# Patient Record
Sex: FEMALE | Race: WHITE | NOT HISPANIC OR LATINO | URBAN - NONMETROPOLITAN AREA
[De-identification: names, ages, dates, MRNs, and addresses within clinical notes are randomized per-mention and may not be internally consistent; named-entity substitution may affect disease eponyms.]

---

## 2020-07-29 ENCOUNTER — OFFICE VISIT (OUTPATIENT)
Dept: URBAN - NONMETROPOLITAN AREA CLINIC 2 | Facility: CLINIC | Age: 75
End: 2020-07-29
Payer: MEDICARE

## 2020-07-29 DIAGNOSIS — K50.90 CROHN'S DISEASE: ICD-10-CM

## 2020-07-29 DIAGNOSIS — Z12.11 SCREENING FOR COLON CANCER: ICD-10-CM

## 2020-07-29 DIAGNOSIS — E80.6 HYPERBILIRUBINEMIA: ICD-10-CM

## 2020-07-29 DIAGNOSIS — Z86.19 HISTORY OF CLOSTRIDIUM DIFFICILE COLITIS: ICD-10-CM

## 2020-07-29 DIAGNOSIS — R10.9 ABDOMINAL PAIN: ICD-10-CM

## 2020-07-29 PROCEDURE — 1036F TOBACCO NON-USER: CPT | Performed by: NURSE PRACTITIONER

## 2020-07-29 PROCEDURE — G8427 DOCREV CUR MEDS BY ELIG CLIN: HCPCS | Performed by: NURSE PRACTITIONER

## 2020-07-29 PROCEDURE — 99213 OFFICE O/P EST LOW 20 MIN: CPT | Performed by: NURSE PRACTITIONER

## 2020-07-29 PROCEDURE — 3017F COLORECTAL CA SCREEN DOC REV: CPT | Performed by: NURSE PRACTITIONER

## 2020-07-29 PROCEDURE — G8417 CALC BMI ABV UP PARAM F/U: HCPCS | Performed by: NURSE PRACTITIONER

## 2020-07-29 RX ORDER — FLUTICASONE PROPIONATE 50 UG/1
SPRAY, METERED NASAL
Qty: 0 | Refills: 0 | Status: ACTIVE | COMMUNITY
Start: 1900-01-01

## 2020-07-29 RX ORDER — MERCAPTOPURINE 50 MG/1
2 TABS PO DAILY TABLET ORAL
Qty: 180 | Refills: 3 | Status: ACTIVE | COMMUNITY
Start: 2019-10-10 | End: 2020-10-04

## 2020-07-29 RX ORDER — MESALAMINE 800 MG/1
TAKE 2 TABLETS (1,600 MG) BY ORAL ROUTE 3 TIMES PER DAY FOR 90 DAYS TABLET, DELAYED RELEASE ORAL
Qty: 540 | Refills: 3 | Status: ACTIVE | COMMUNITY
Start: 2019-10-10 | End: 2020-10-04

## 2020-07-29 RX ORDER — ESTRADIOL 0.1 MG/G
CREAM VAGINAL
Qty: 0 | Refills: 0 | Status: ACTIVE | COMMUNITY
Start: 1900-01-01

## 2020-07-29 RX ORDER — LEVOTHYROXINE SODIUM 0.05 MG/1
TABLET ORAL
Qty: 0 | Refills: 0 | Status: ACTIVE | COMMUNITY
Start: 1900-01-01

## 2020-07-29 RX ORDER — PREDNISONE 5 MG/1
TAKE 4 PILLS BY MOUTH DAILY FOR 1 WEEK, 3 DAILY FOR A WEEK, 2 DAILY FOR A WEEK, 1 DAILY FOR A WEEK TABLET ORAL
Qty: 100 | Refills: 3 | Status: ACTIVE | COMMUNITY
Start: 2019-09-12

## 2020-07-29 NOTE — HPI-OTHER HISTORIES
7/29/20 Patient presents for follow up for Crohns. She remains on Asacol 6 a day and Imuran 100mg daily. No flare since her last visit, however she's had trouble with reflux symptoms and PCP has noted her bilirubin has gone up to 2 in June. Last June it was 1.15 and prior to that 1.35. Her reflux is controlled since Dr. Faith started Pepcid 20mg BID and now with no breakthrough symptoms. She is having some lower extremity edmea.   In terms of her Crohns, she has two formed stool daily. No bleeding. She is very pleased with these symptoms and tells me she is willing to put up with side effects. She does not want to go back to 4 tablets on the Asacol due to the severity of flares last year. TG  10/10/2019 Mrs. Halina Soares is a 74 year old female with Crohns who presents for follow up. She saw Dr. Dykes in September with flare that was ongoing despite pred taper given by PCP. She increased her Asacol from 3-4 daily to 6 daily after her last visit and completed a second pred taper. She took her last pred tablet this morning. Her sx have resolved. She continues on Asacol 6 tablets daily and Imuran 100 mg daily. She has 1-3 bowel movements daily that are formed. No bleeding. No mucous. No abdominal pain. Her last colonoscopy 28/2018 showed mild disease in recto-sig, 2 inflammatory polyps in th rectum, o/w normal. TI was not entered.   Her only complaint today is intermittent bloating and gas with borborygmi. She is taking a probiotic but does not recall the name. TG

## 2020-10-28 ENCOUNTER — OFFICE VISIT (OUTPATIENT)
Dept: URBAN - NONMETROPOLITAN AREA CLINIC 2 | Facility: CLINIC | Age: 75
End: 2020-10-28
Payer: MEDICARE

## 2020-10-28 DIAGNOSIS — Z12.11 SCREENING FOR COLON CANCER: ICD-10-CM

## 2020-10-28 DIAGNOSIS — Z86.19 HISTORY OF CLOSTRIDIUM DIFFICILE COLITIS: ICD-10-CM

## 2020-10-28 DIAGNOSIS — K50.90 CROHN'S DISEASE: ICD-10-CM

## 2020-10-28 DIAGNOSIS — R10.9 ABDOMINAL PAIN: ICD-10-CM

## 2020-10-28 DIAGNOSIS — E80.6 HYPERBILIRUBINEMIA: ICD-10-CM

## 2020-10-28 PROCEDURE — 3017F COLORECTAL CA SCREEN DOC REV: CPT | Performed by: INTERNAL MEDICINE

## 2020-10-28 PROCEDURE — 99213 OFFICE O/P EST LOW 20 MIN: CPT | Performed by: INTERNAL MEDICINE

## 2020-10-28 PROCEDURE — G8417 CALC BMI ABV UP PARAM F/U: HCPCS | Performed by: INTERNAL MEDICINE

## 2020-10-28 PROCEDURE — 1036F TOBACCO NON-USER: CPT | Performed by: INTERNAL MEDICINE

## 2020-10-28 PROCEDURE — G8427 DOCREV CUR MEDS BY ELIG CLIN: HCPCS | Performed by: INTERNAL MEDICINE

## 2020-10-28 PROCEDURE — G8482 FLU IMMUNIZE ORDER/ADMIN: HCPCS | Performed by: INTERNAL MEDICINE

## 2020-10-28 RX ORDER — ESTRADIOL 0.1 MG/G
CREAM VAGINAL
Qty: 0 | Refills: 0 | Status: ACTIVE | COMMUNITY
Start: 1900-01-01

## 2020-10-28 RX ORDER — PREDNISONE 5 MG/1
TAKE 4 PILLS BY MOUTH DAILY FOR 1 WEEK, 3 DAILY FOR A WEEK, 2 DAILY FOR A WEEK, 1 DAILY FOR A WEEK TABLET ORAL
Qty: 100 | Refills: 3 | Status: ACTIVE | COMMUNITY
Start: 2019-09-12

## 2020-10-28 RX ORDER — LEVOTHYROXINE SODIUM 0.05 MG/1
TABLET ORAL
Qty: 0 | Refills: 0 | Status: ACTIVE | COMMUNITY
Start: 1900-01-01

## 2020-10-28 RX ORDER — FLUTICASONE PROPIONATE 50 UG/1
SPRAY, METERED NASAL
Qty: 0 | Refills: 0 | Status: ACTIVE | COMMUNITY
Start: 1900-01-01

## 2020-10-28 NOTE — HPI-OTHER HISTORIES
10/10/2019 Mrs. Halina Soares is a 74 year old female with Crohns who presents for follow up. She saw Dr. Dykes in September with flare that was ongoing despite pred taper given by PCP. She increased her Asacol from 3-4 daily to 6 daily after her last visit and completed a second pred taper. She took her last pred tablet this morning. Her sx have resolved. She continues on Asacol 6 tablets daily and Imuran 100 mg daily. She has 1-3 bowel movements daily that are formed. No bleeding. No mucous. No abdominal pain. Her last colonoscopy 28/2018 showed mild disease in recto-sig, 2 inflammatory polyps in th rectum, o/w normal. TI was not entered.   Her only complaint today is intermittent bloating and gas with borborygmi. She is taking a probiotic but does not recall the name.    7/29/20 Patient presents for follow up for Crohns. She remains on Asacol 6 a day and Imuran 100mg daily. No flare since her last visit, however she's had trouble with reflux symptoms and PCP has noted her bilirubin has gone up to 2 in June. Last June it was 1.15 and prior to that 1.35. Her reflux is controlled since Dr. Faith started Pepcid 20mg BID and now with no breakthrough symptoms. She is having some lower extremity edmea.   In terms of her Crohns, she has two formed stool daily. No bleeding. She is very pleased with these symptoms and tells me she is willing to put up with side effects. She does not want to go back to 4 tablets on the Asacol due to the severity of flares last year.   10/28/20  Patient presents for follow up for Crohns on Asacol and Imuran. Her bilirubin continues to trend up. Otherwise her UC seems to be well controlled. No recent flares. Stools are formed. No rectal bleeding. She has not had a flare. No EIM.

## 2020-10-29 LAB
BASO (ABSOLUTE): 0
BASOS: 0
EOS (ABSOLUTE): 0.2
EOS: 7
HEMATOCRIT: 30.7
HEMATOLOGY COMMENTS:: (no result)
HEMOGLOBIN: 10.8
IMMATURE CELLS: (no result)
IMMATURE GRANS (ABS): 0
IMMATURE GRANULOCYTES: 0
LYMPHS (ABSOLUTE): 0.6
LYMPHS: 25
MCH: 41.4
MCHC: 35.2
MCV: 118
MONOCYTES(ABSOLUTE): 0.2
MONOCYTES: 9
NEUTROPHILS (ABSOLUTE): 1.4
NEUTROPHILS: 59
NRBC: (no result)
PLATELETS: 174
RBC: 2.61
RDW: 15.4
WBC: 2.4

## 2020-11-02 ENCOUNTER — LAB OUTSIDE AN ENCOUNTER (OUTPATIENT)
Dept: URBAN - METROPOLITAN AREA CLINIC 92 | Facility: CLINIC | Age: 75
End: 2020-11-02

## 2020-11-02 ENCOUNTER — TELEPHONE ENCOUNTER (OUTPATIENT)
Dept: URBAN - METROPOLITAN AREA CLINIC 92 | Facility: CLINIC | Age: 75
End: 2020-11-02

## 2020-11-09 ENCOUNTER — TELEPHONE ENCOUNTER (OUTPATIENT)
Dept: URBAN - NONMETROPOLITAN AREA CLINIC 2 | Facility: CLINIC | Age: 75
End: 2020-11-09

## 2020-11-09 RX ORDER — MESALAMINE 800 MG/1
TAKE 2 TABLETS (1,600 MG) BY ORAL ROUTE 3 TIMES PER DAY FOR 90 DAYS TABLET, DELAYED RELEASE ORAL
Qty: 540 TABLET | Refills: 3
Start: 2019-10-10 | End: 2020-10-04

## 2020-11-20 LAB
A/G RATIO: 1.6
ALBUMIN: 3.7
ALKALINE PHOSPHATASE: 74
ALT (SGPT): 18
AST (SGOT): 27
BASO (ABSOLUTE): 0
BASOS: 0
BILIRUBIN, TOTAL: 1.2
BUN/CREATININE RATIO: 20
BUN: 19
CALCIUM: 9.2
CARBON DIOXIDE, TOTAL: 24
CHLORIDE: 110
CREATININE: 0.94
EGFR IF AFRICN AM: 69
EGFR IF NONAFRICN AM: 60
EOS (ABSOLUTE): 0.1
EOS: 4
GLOBULIN, TOTAL: 2.3
GLUCOSE: 95
HEMATOCRIT: 32.5
HEMATOLOGY COMMENTS:: (no result)
HEMOGLOBIN: 11.2
IMMATURE CELLS: (no result)
IMMATURE GRANS (ABS): 0
IMMATURE GRANULOCYTES: 0
LYMPHS (ABSOLUTE): 0.8
LYMPHS: 28
MCH: 40.3
MCHC: 34.5
MCV: 117
MONOCYTES(ABSOLUTE): 0.3
MONOCYTES: 12
NEUTROPHILS (ABSOLUTE): 1.6
NEUTROPHILS: 56
NRBC: (no result)
PLATELETS: 156
POTASSIUM: 4.1
PROTEIN, TOTAL: 6
RBC: 2.78
RDW: 14
SODIUM: 145
WBC: 2.8

## 2020-11-25 ENCOUNTER — TELEPHONE ENCOUNTER (OUTPATIENT)
Dept: URBAN - NONMETROPOLITAN AREA CLINIC 2 | Facility: CLINIC | Age: 75
End: 2020-11-25

## 2020-12-01 ENCOUNTER — ERX REFILL RESPONSE (OUTPATIENT)
Age: 75
End: 2020-12-01

## 2020-12-01 RX ORDER — MERCAPTOPURINE 50 MG/1
TAKE 1 TABLETS BY MOUTH ONCE DAILY TABLET ORAL DAILY
Qty: 90 TABLETS | Refills: 1

## 2020-12-08 ENCOUNTER — LAB OUTSIDE AN ENCOUNTER (OUTPATIENT)
Dept: URBAN - NONMETROPOLITAN AREA CLINIC 2 | Facility: CLINIC | Age: 75
End: 2020-12-08

## 2020-12-09 LAB
A/G RATIO: 1.6
ALBUMIN: 3.9
ALKALINE PHOSPHATASE: 82
ALT (SGPT): 13
AST (SGOT): 23
BASO (ABSOLUTE): 0
BASOS: 0
BILIRUBIN, TOTAL: 1.3
BUN/CREATININE RATIO: 24
BUN: 21
CALCIUM: 9.5
CARBON DIOXIDE, TOTAL: 26
CHLORIDE: 104
CREATININE: 0.88
EGFR IF AFRICN AM: 75
EGFR IF NONAFRICN AM: 65
EOS (ABSOLUTE): 0.1
EOS: 3
GLOBULIN, TOTAL: 2.4
GLUCOSE: 77
HEMATOCRIT: 35.4
HEMATOLOGY COMMENTS:: (no result)
HEMOGLOBIN: 12.1
IMMATURE CELLS: (no result)
IMMATURE GRANS (ABS): 0
IMMATURE GRANULOCYTES: 0
LYMPHS (ABSOLUTE): 1
LYMPHS: 25
MCH: 38.9
MCHC: 34.2
MCV: 114
MONOCYTES(ABSOLUTE): 0.3
MONOCYTES: 9
NEUTROPHILS (ABSOLUTE): 2.3
NEUTROPHILS: 63
NRBC: (no result)
PLATELETS: 111
POTASSIUM: 3.6
PROTEIN, TOTAL: 6.3
RBC: 3.11
RDW: 12.7
SODIUM: 142
WBC: 3.8

## 2020-12-11 ENCOUNTER — TELEPHONE ENCOUNTER (OUTPATIENT)
Dept: URBAN - METROPOLITAN AREA CLINIC 92 | Facility: CLINIC | Age: 75
End: 2020-12-11

## 2021-01-12 ENCOUNTER — TELEPHONE ENCOUNTER (OUTPATIENT)
Dept: URBAN - METROPOLITAN AREA CLINIC 92 | Facility: CLINIC | Age: 76
End: 2021-01-12

## 2021-02-05 ENCOUNTER — LAB OUTSIDE AN ENCOUNTER (OUTPATIENT)
Dept: URBAN - NONMETROPOLITAN AREA CLINIC 2 | Facility: CLINIC | Age: 76
End: 2021-02-05

## 2021-02-05 ENCOUNTER — OFFICE VISIT (OUTPATIENT)
Dept: URBAN - NONMETROPOLITAN AREA CLINIC 2 | Facility: CLINIC | Age: 76
End: 2021-02-05
Payer: MEDICARE

## 2021-02-05 DIAGNOSIS — Z86.19 HISTORY OF CLOSTRIDIUM DIFFICILE COLITIS: ICD-10-CM

## 2021-02-05 DIAGNOSIS — K50.90 CROHN'S DISEASE: ICD-10-CM

## 2021-02-05 DIAGNOSIS — Z51.81 THERAPEUTIC DRUG MONITORING: ICD-10-CM

## 2021-02-05 DIAGNOSIS — R10.9 ABDOMINAL PAIN: ICD-10-CM

## 2021-02-05 DIAGNOSIS — Z12.11 SCREENING FOR COLON CANCER: ICD-10-CM

## 2021-02-05 PROCEDURE — 3017F COLORECTAL CA SCREEN DOC REV: CPT | Performed by: INTERNAL MEDICINE

## 2021-02-05 PROCEDURE — G8417 CALC BMI ABV UP PARAM F/U: HCPCS | Performed by: INTERNAL MEDICINE

## 2021-02-05 PROCEDURE — G8484 FLU IMMUNIZE NO ADMIN: HCPCS | Performed by: INTERNAL MEDICINE

## 2021-02-05 PROCEDURE — G9903 PT SCRN TBCO ID AS NON USER: HCPCS | Performed by: INTERNAL MEDICINE

## 2021-02-05 PROCEDURE — 99214 OFFICE O/P EST MOD 30 MIN: CPT | Performed by: INTERNAL MEDICINE

## 2021-02-05 PROCEDURE — G8427 DOCREV CUR MEDS BY ELIG CLIN: HCPCS | Performed by: INTERNAL MEDICINE

## 2021-02-05 RX ORDER — PREDNISONE 5 MG/1
TAKE 4 PILLS BY MOUTH DAILY FOR 1 WEEK, 3 DAILY FOR A WEEK, 2 DAILY FOR A WEEK, 1 DAILY FOR A WEEK TABLET ORAL
Qty: 100 | Refills: 3 | Status: ACTIVE | COMMUNITY
Start: 2019-09-12

## 2021-02-05 RX ORDER — MERCAPTOPURINE 50 MG/1
TAKE 1 TABLETS BY MOUTH ONCE DAILY TABLET ORAL DAILY
Qty: 90 TABLETS | Refills: 1 | Status: ACTIVE | COMMUNITY

## 2021-02-05 RX ORDER — FLUTICASONE PROPIONATE 50 UG/1
SPRAY, METERED NASAL
Qty: 0 | Refills: 0 | Status: ACTIVE | COMMUNITY
Start: 1900-01-01

## 2021-02-05 RX ORDER — SODIUM, POTASSIUM,MAG SULFATES 17.5-3.13G
TAKE 240 ML SOLUTION, RECONSTITUTED, ORAL ORAL
Qty: 1 KIT | Refills: 0 | OUTPATIENT
Start: 2021-02-05 | End: 2021-02-06

## 2021-02-05 RX ORDER — ESTRADIOL 0.1 MG/G
CREAM VAGINAL
Qty: 0 | Refills: 0 | Status: ACTIVE | COMMUNITY
Start: 1900-01-01

## 2021-02-05 RX ORDER — LEVOTHYROXINE SODIUM 0.05 MG/1
TABLET ORAL
Qty: 0 | Refills: 0 | Status: ACTIVE | COMMUNITY
Start: 1900-01-01

## 2021-02-05 NOTE — HPI-TODAY'S VISIT:
Patient presents for follow-up of Crohn's disease.  She is currently on mercaptopurine 50 mg daily Asacol.  She had been on a 100 but lab work revealed mild leukopenia and lymphocytopenia as well as a rising bilirubin so that mercaptopurine was stopped for a while and then started back in 50 mg.  Her numbers have subsequently resolved.  She is followed at the hematology clinic for this. In terms of the Crohn's disease she is, overall, doing well.  She does state that since January 1 she has had 9 episodes of rectal bleeding.  This is enough to discolor the water in the commode.  Typically this bleeding is with passage of a hard stool.  She states that she has 1-3 formed stools daily.  At times they are hard and she has to strain.  She has no rectal pain, burning or itching.  She is not aware of hemorrhoids.  She has no abdominal pain.  There is some discomfort if she has a great deal of gas.  She has no fever, chills or night sweats.  She denies extraintestinal manifestations. She has no upper GI symptoms.

## 2021-03-23 ENCOUNTER — CLAIMS CREATED FROM THE CLAIM WINDOW (OUTPATIENT)
Dept: URBAN - METROPOLITAN AREA CLINIC 4 | Facility: CLINIC | Age: 76
End: 2021-03-23
Payer: MEDICARE

## 2021-03-23 ENCOUNTER — OFFICE VISIT (OUTPATIENT)
Dept: URBAN - NONMETROPOLITAN AREA SURGERY CENTER 1 | Facility: SURGERY CENTER | Age: 76
End: 2021-03-23
Payer: MEDICARE

## 2021-03-23 DIAGNOSIS — K50.90 ACUTE CROHN'S DISEASE: ICD-10-CM

## 2021-03-23 DIAGNOSIS — D12.2 ADENOMA OF ASCENDING COLON: ICD-10-CM

## 2021-03-23 DIAGNOSIS — K63.89 MASS OF HEPATIC FLEXURE OF COLON: ICD-10-CM

## 2021-03-23 DIAGNOSIS — D12.2 BENIGN NEOPLASM OF ASCENDING COLON: ICD-10-CM

## 2021-03-23 PROCEDURE — 88313 SPECIAL STAINS GROUP 2: CPT | Performed by: PATHOLOGY

## 2021-03-23 PROCEDURE — 88341 IMHCHEM/IMCYTCHM EA ADD ANTB: CPT | Performed by: PATHOLOGY

## 2021-03-23 PROCEDURE — 88342 IMHCHEM/IMCYTCHM 1ST ANTB: CPT | Performed by: PATHOLOGY

## 2021-03-23 PROCEDURE — 45385 COLONOSCOPY W/LESION REMOVAL: CPT | Performed by: INTERNAL MEDICINE

## 2021-03-23 PROCEDURE — G8907 PT DOC NO EVENTS ON DISCHARG: HCPCS | Performed by: INTERNAL MEDICINE

## 2021-03-23 PROCEDURE — 45380 COLONOSCOPY AND BIOPSY: CPT | Performed by: INTERNAL MEDICINE

## 2021-03-23 PROCEDURE — 88305 TISSUE EXAM BY PATHOLOGIST: CPT | Performed by: PATHOLOGY

## 2021-04-29 ENCOUNTER — OFFICE VISIT (OUTPATIENT)
Dept: URBAN - NONMETROPOLITAN AREA CLINIC 13 | Facility: CLINIC | Age: 76
End: 2021-04-29
Payer: MEDICARE

## 2021-04-29 VITALS
SYSTOLIC BLOOD PRESSURE: 162 MMHG | BODY MASS INDEX: 36.64 KG/M2 | WEIGHT: 228 LBS | TEMPERATURE: 97.5 F | DIASTOLIC BLOOD PRESSURE: 78 MMHG | HEART RATE: 71 BPM | HEIGHT: 66 IN

## 2021-04-29 DIAGNOSIS — R10.9 ABDOMINAL PAIN: ICD-10-CM

## 2021-04-29 DIAGNOSIS — Z86.19 HISTORY OF CLOSTRIDIUM DIFFICILE COLITIS: ICD-10-CM

## 2021-04-29 DIAGNOSIS — Z86.010 PERSONAL HISTORY OF COLONIC POLYPS: ICD-10-CM

## 2021-04-29 DIAGNOSIS — K50.90 CROHN'S DISEASE: ICD-10-CM

## 2021-04-29 DIAGNOSIS — Z12.11 SCREENING FOR COLON CANCER: ICD-10-CM

## 2021-04-29 DIAGNOSIS — Z51.81 THERAPEUTIC DRUG MONITORING: ICD-10-CM

## 2021-04-29 PROCEDURE — 99214 OFFICE O/P EST MOD 30 MIN: CPT | Performed by: INTERNAL MEDICINE

## 2021-04-29 RX ORDER — PREDNISONE 5 MG/1
TAKE 4 PILLS BY MOUTH DAILY FOR 1 WEEK, 3 DAILY FOR A WEEK, 2 DAILY FOR A WEEK, 1 DAILY FOR A WEEK TABLET ORAL
Qty: 100 | Refills: 3 | Status: ACTIVE | COMMUNITY
Start: 2019-09-12

## 2021-04-29 RX ORDER — FLUTICASONE PROPIONATE 50 UG/1
SPRAY, METERED NASAL
Qty: 0 | Refills: 0 | Status: ACTIVE | COMMUNITY
Start: 1900-01-01

## 2021-04-29 RX ORDER — MERCAPTOPURINE 50 MG/1
TAKE 1 TABLETS BY MOUTH ONCE DAILY TABLET ORAL DAILY
Qty: 90 TABLETS | Refills: 1 | Status: ACTIVE | COMMUNITY

## 2021-04-29 RX ORDER — ESTRADIOL 0.1 MG/G
CREAM VAGINAL
Qty: 0 | Refills: 0 | Status: ACTIVE | COMMUNITY
Start: 1900-01-01

## 2021-04-29 RX ORDER — LEVOTHYROXINE SODIUM 0.05 MG/1
TABLET ORAL
Qty: 0 | Refills: 0 | Status: ACTIVE | COMMUNITY
Start: 1900-01-01

## 2021-04-29 NOTE — HPI-TODAY'S VISIT:
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  She is currently on mercaptopurine 50 mg daily Asacol.  She had been on a 100 but lab work revealed mild leukopenia and lymphocytopenia as well as a rising bilirubin so that mercaptopurine was stopped for a while and then started back in 50 mg.  Her numbers have subsequently resolved.  She is followed at the hematology clinic for this. In terms of the Crohn's disease she is, overall, doing well.  She does state that since January 1 she has had 9 episodes of rectal bleeding.  This is enough to discolor the water in the commode.  Typically this bleeding is with passage of a hard stool.  She states that she has 1-3 formed stools daily.  At times they are hard and she has to strain.  She has no rectal pain, burning or itching.  She is not aware of hemorrhoids.  She has no abdominal pain.  There is some discomfort if she has a great deal of gas.  She has no fever, chills or night sweats.  She denies extraintestinal manifestations. She has no upper GI symptoms.  4/29/21 Doing well. Colonoscopy was good with a polyp. labs reviewed from PCP in March, lfts are normal with t bili slightly elevated at 1.22. she is going to [facility] for labs soon. will give her orders for cbc, bmp, hepatic function panel to see direct/indirect. otherwise she is doing great. TG

## 2021-07-29 ENCOUNTER — OFFICE VISIT (OUTPATIENT)
Dept: URBAN - NONMETROPOLITAN AREA CLINIC 13 | Facility: CLINIC | Age: 76
End: 2021-07-29

## 2021-08-02 ENCOUNTER — ERX REFILL RESPONSE (OUTPATIENT)
Dept: URBAN - NONMETROPOLITAN AREA CLINIC 2 | Facility: CLINIC | Age: 76
End: 2021-08-02

## 2021-08-02 RX ORDER — MERCAPTOPURINE 50 MG/1
TAKE 1 TABLET BY MOUTH ONCE DAILY FOR 90 DAYS TABLET ORAL
Qty: 90 TABLET | Refills: 1 | OUTPATIENT

## 2021-08-02 RX ORDER — MERCAPTOPURINE 50 MG/1
TAKE 1 TABLETS BY MOUTH ONCE DAILY TABLET ORAL DAILY
Qty: 90 TABLETS | Refills: 1 | OUTPATIENT

## 2021-08-26 ENCOUNTER — OFFICE VISIT (OUTPATIENT)
Dept: URBAN - NONMETROPOLITAN AREA CLINIC 13 | Facility: CLINIC | Age: 76
End: 2021-08-26
Payer: MEDICARE

## 2021-08-26 ENCOUNTER — WEB ENCOUNTER (OUTPATIENT)
Dept: URBAN - NONMETROPOLITAN AREA CLINIC 13 | Facility: CLINIC | Age: 76
End: 2021-08-26

## 2021-08-26 VITALS
WEIGHT: 227 LBS | SYSTOLIC BLOOD PRESSURE: 133 MMHG | HEART RATE: 64 BPM | DIASTOLIC BLOOD PRESSURE: 76 MMHG | BODY MASS INDEX: 36.48 KG/M2 | HEIGHT: 66 IN

## 2021-08-26 DIAGNOSIS — R10.84 ABDOMINAL PAIN, GENERALIZED: ICD-10-CM

## 2021-08-26 DIAGNOSIS — Z12.11 SCREENING FOR COLON CANCER: ICD-10-CM

## 2021-08-26 DIAGNOSIS — K50.80 CROHN'S DISEASE COLON: ICD-10-CM

## 2021-08-26 DIAGNOSIS — Z51.81 THERAPEUTIC DRUG MONITORING: ICD-10-CM

## 2021-08-26 DIAGNOSIS — Z86.19 HISTORY OF CLOSTRIDIUM DIFFICILE COLITIS: ICD-10-CM

## 2021-08-26 DIAGNOSIS — Z86.010 PERSONAL HISTORY OF COLONIC POLYPS: ICD-10-CM

## 2021-08-26 PROCEDURE — 99213 OFFICE O/P EST LOW 20 MIN: CPT | Performed by: NURSE PRACTITIONER

## 2021-08-26 RX ORDER — MERCAPTOPURINE 50 MG/1
TAKE 1 TABLET BY MOUTH ONCE DAILY FOR 90 DAYS TABLET ORAL
Qty: 90 TABLET | Refills: 1 | Status: ACTIVE | COMMUNITY

## 2021-08-26 RX ORDER — PREDNISONE 5 MG/1
TAKE 4 PILLS BY MOUTH DAILY FOR 1 WEEK, 3 DAILY FOR A WEEK, 2 DAILY FOR A WEEK, 1 DAILY FOR A WEEK TABLET ORAL
Qty: 100 | Refills: 3 | Status: ON HOLD | COMMUNITY
Start: 2019-09-12

## 2021-08-26 RX ORDER — FLUTICASONE PROPIONATE 50 UG/1
SPRAY, METERED NASAL
Qty: 0 | Refills: 0 | Status: ACTIVE | COMMUNITY
Start: 1900-01-01

## 2021-08-26 RX ORDER — MERCAPTOPURINE 50 MG/1
TAKE 1 TABLET BY MOUTH ONCE DAILY FOR 90 DAYS TABLET ORAL
OUTPATIENT

## 2021-08-26 RX ORDER — LEVOTHYROXINE SODIUM 0.05 MG/1
TABLET ORAL
Qty: 0 | Refills: 0 | Status: ACTIVE | COMMUNITY
Start: 1900-01-01

## 2021-08-26 RX ORDER — ESTRADIOL 0.1 MG/G
CREAM VAGINAL
Qty: 0 | Refills: 0 | Status: ACTIVE | COMMUNITY
Start: 1900-01-01

## 2021-08-26 NOTE — HPI-TODAY'S VISIT:
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  She is currently on mercaptopurine 50 mg daily Asacol.  She had been on a 100 but lab work revealed mild leukopenia and lymphocytopenia as well as a rising bilirubin so that mercaptopurine was stopped for a while and then started back in 50 mg.  Her numbers have subsequently resolved.  She is followed at the hematology clinic for this. In terms of the Crohn's disease she is, overall, doing well.  She does state that since January 1 she has had 9 episodes of rectal bleeding.  This is enough to discolor the water in the commode.  Typically this bleeding is with passage of a hard stool.  She states that she has 1-3 formed stools daily.  At times they are hard and she has to strain.  She has no rectal pain, burning or itching.  She is not aware of hemorrhoids.  She has no abdominal pain.  There is some discomfort if she has a great deal of gas.  She has no fever, chills or night sweats.  She denies extraintestinal manifestations. She has no upper GI symptoms.  4/29/21 Doing well. Colonoscopy was good with a polyp. labs reviewed from PCP in March, lfts are normal with t bili slightly elevated at 1.22. she is going to [facility] [place] [facility] for labs soon. will give her orders for cbc, bmp, hepatic function panel to see direct/indirect. otherwise she is doing great. TG  Asacol 800 two tablets twice daily.  on omeprazole 20mg once daily  8/26/21 Halina presents for follow up for Crohns. She continues on mercaptopurine 50mg once daily and Asacol 800 mg two tablets twice daily. She is doing well. No diarrhea. No rectal bleeding. No mucous in her stools. Denies n/v. Her heartburn is well controlled on omeprazole. She continues to follow with [facility] every 6 months for her anemia and has not needed any iron infusions. She has labs with Dr. Daphne Stacy every 6 months as well. She has not had LFTs recently. TG

## 2021-11-02 ENCOUNTER — ERX REFILL RESPONSE (OUTPATIENT)
Dept: URBAN - NONMETROPOLITAN AREA CLINIC 2 | Facility: CLINIC | Age: 76
End: 2021-11-02

## 2021-11-02 RX ORDER — MERCAPTOPURINE 50 MG/1
TAKE 1 TABLET BY MOUTH ONCE DAILY FOR 90 DAYS TABLET ORAL
Qty: 90 TABLET | Refills: 1 | OUTPATIENT

## 2021-12-13 ENCOUNTER — TELEPHONE ENCOUNTER (OUTPATIENT)
Dept: URBAN - NONMETROPOLITAN AREA CLINIC 2 | Facility: CLINIC | Age: 76
End: 2021-12-13

## 2021-12-13 RX ORDER — MESALAMINE 800 MG/1
2 TABLETS TABLET, DELAYED RELEASE ORAL THREE TIMES A DAY
Qty: 540 TABLET | Refills: 1

## 2021-12-17 ENCOUNTER — TELEPHONE ENCOUNTER (OUTPATIENT)
Dept: URBAN - NONMETROPOLITAN AREA CLINIC 2 | Facility: CLINIC | Age: 76
End: 2021-12-17

## 2021-12-17 RX ORDER — MESALAMINE 800 MG/1
2 TABLETS TABLET, DELAYED RELEASE ORAL THREE TIMES A DAY
Qty: 540 TABLET | Refills: 1
End: 2022-06-15

## 2021-12-28 NOTE — PHYSICAL EXAM HENT:
Head, normocephalic, atraumatic, Face, Face within normal limits, Ears, External ears within normal limits, Nose/Nasopharynx, External nose  normal appearance, nares patent, no nasal discharge, Mouth and Throat, Oral cavity appearance normal, Breath odor normal, Lips, Appearance normal Syncope

## 2022-02-07 ENCOUNTER — TELEPHONE ENCOUNTER (OUTPATIENT)
Dept: URBAN - NONMETROPOLITAN AREA CLINIC 2 | Facility: CLINIC | Age: 77
End: 2022-02-07

## 2022-02-07 RX ORDER — MERCAPTOPURINE 50 MG/1
TAKE 1 TABLET BY MOUTH ONCE DAILY FOR 90 DAYS TABLET ORAL
Qty: 90 TABLET | Refills: 1

## 2022-02-22 ENCOUNTER — WEB ENCOUNTER (OUTPATIENT)
Dept: URBAN - NONMETROPOLITAN AREA CLINIC 2 | Facility: CLINIC | Age: 77
End: 2022-02-22

## 2022-02-22 ENCOUNTER — OFFICE VISIT (OUTPATIENT)
Dept: URBAN - NONMETROPOLITAN AREA CLINIC 2 | Facility: CLINIC | Age: 77
End: 2022-02-22
Payer: MEDICARE

## 2022-02-22 DIAGNOSIS — Z86.19 HISTORY OF CLOSTRIDIUM DIFFICILE COLITIS: ICD-10-CM

## 2022-02-22 DIAGNOSIS — R10.84 ABDOMINAL PAIN, GENERALIZED: ICD-10-CM

## 2022-02-22 DIAGNOSIS — K50.80 CROHN'S COLITIS: ICD-10-CM

## 2022-02-22 DIAGNOSIS — Z86.010 PERSONAL HISTORY OF COLONIC POLYPS: ICD-10-CM

## 2022-02-22 PROCEDURE — 99213 OFFICE O/P EST LOW 20 MIN: CPT | Performed by: NURSE PRACTITIONER

## 2022-02-22 RX ORDER — LEVOTHYROXINE SODIUM 0.05 MG/1
TABLET ORAL
Qty: 0 | Refills: 0 | Status: ACTIVE | COMMUNITY
Start: 1900-01-01

## 2022-02-22 RX ORDER — FLUTICASONE PROPIONATE 50 UG/1
SPRAY, METERED NASAL
Qty: 0 | Refills: 0 | Status: ACTIVE | COMMUNITY
Start: 1900-01-01

## 2022-02-22 RX ORDER — MESALAMINE 800 MG/1
2 TABLETS TABLET, DELAYED RELEASE ORAL THREE TIMES A DAY
Qty: 540 TABLETS | Refills: 3

## 2022-02-22 RX ORDER — ESTRADIOL 0.1 MG/G
CREAM VAGINAL
Qty: 0 | Refills: 0 | Status: ACTIVE | COMMUNITY
Start: 1900-01-01

## 2022-02-22 RX ORDER — PREDNISONE 5 MG/1
TAKE 4 PILLS BY MOUTH DAILY FOR 1 WEEK, 3 DAILY FOR A WEEK, 2 DAILY FOR A WEEK, 1 DAILY FOR A WEEK TABLET ORAL
Qty: 100 | Refills: 3 | Status: ON HOLD | COMMUNITY
Start: 2019-09-12

## 2022-02-22 RX ORDER — MERCAPTOPURINE 50 MG/1
TAKE 1 TABLET BY MOUTH ONCE DAILY FOR 90 DAYS TABLET ORAL
Qty: 90 TABLET | Refills: 1 | Status: ACTIVE | COMMUNITY

## 2022-02-22 RX ORDER — MESALAMINE 800 MG/1
2 TABLETS TABLET, DELAYED RELEASE ORAL THREE TIMES A DAY
Qty: 540 TABLET | Refills: 1 | Status: ACTIVE | COMMUNITY
End: 2022-06-15

## 2022-02-22 RX ORDER — MERCAPTOPURINE 50 MG/1
1 TABLETS TABLET ORAL ONCE DAILY
Qty: 90 TABLETS | Refills: 3

## 2022-02-22 NOTE — HPI-TODAY'S VISIT:
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  She is currently on mercaptopurine 50 mg daily Asacol.  She had been on a 100 but lab work revealed mild leukopenia and lymphocytopenia as well as a rising bilirubin so that mercaptopurine was stopped for a while and then started back in 50 mg.  Labs have normalized.  She is followed at the hematology clinic for this. In terms of the Crohn's disease she is, overall, doing well.  She does state that since January 1 she has had 9 episodes of rectal bleeding.  This is enough to discolor the water in the commode.  Typically this bleeding is with passage of a hard stool.  She states that she has 1-3 formed stools daily.  At times they are hard and she has to strain.  She has no rectal pain, burning or itching.  She is not aware of hemorrhoids.  She has no abdominal pain.  There is some discomfort if she has a great deal of gas.  She has no fever, chills or night sweats.  She denies extraintestinal manifestations. She has no upper GI symptoms.   2/22/2021 Halina is a very pleasant 77 YO F with Crohns maintained on mercaptopurine 50mg once daily and Asacol who presents for follow up. She had labs in December with [facility] and brings them in for review today. WBC slightly down at 4.2 with lymphocytes of 900. Hgb WNL and LFTS including bili WNL.  She has normal stools with occasional constipation. She did not want to take the fiber but is doing well with prunes in AM and occasionally in the evening as well. Denies abdominal pain, rectal bleeding, black stools, nausea, vomiting.  She follows at [facility] for her MARIA INES and gets injectafer PRN. Also has B12 deficiency on B12 injections monthly, prescribed by [facility].  Her last colonoscopy was 3/2021 with a small TA in the ascending colon, lipoma in ascending colon, left sided diverticulosis. TI was normal. Random colon bx were unremarkable. Due for repeat 3/2024. TG

## 2022-07-25 ENCOUNTER — TELEPHONE ENCOUNTER (OUTPATIENT)
Dept: URBAN - NONMETROPOLITAN AREA CLINIC 2 | Facility: CLINIC | Age: 77
End: 2022-07-25

## 2022-07-25 RX ORDER — MERCAPTOPURINE 50 MG/1
1 TABLETS TABLET ORAL ONCE DAILY
Qty: 90 | Refills: 3

## 2022-08-22 ENCOUNTER — OFFICE VISIT (OUTPATIENT)
Dept: URBAN - NONMETROPOLITAN AREA CLINIC 13 | Facility: CLINIC | Age: 77
End: 2022-08-22
Payer: MEDICARE

## 2022-08-22 VITALS
DIASTOLIC BLOOD PRESSURE: 76 MMHG | WEIGHT: 229.4 LBS | HEART RATE: 67 BPM | TEMPERATURE: 97.5 F | BODY MASS INDEX: 36.87 KG/M2 | HEIGHT: 66 IN | SYSTOLIC BLOOD PRESSURE: 130 MMHG

## 2022-08-22 DIAGNOSIS — Z86.010 PERSONAL HISTORY OF COLONIC POLYPS: ICD-10-CM

## 2022-08-22 DIAGNOSIS — Z86.19 HISTORY OF CLOSTRIDIUM DIFFICILE COLITIS: ICD-10-CM

## 2022-08-22 DIAGNOSIS — K50.80 CROHN'S COLITIS: ICD-10-CM

## 2022-08-22 DIAGNOSIS — Z12.11 SCREENING FOR COLON CANCER: ICD-10-CM

## 2022-08-22 DIAGNOSIS — Z51.81 THERAPEUTIC DRUG MONITORING: ICD-10-CM

## 2022-08-22 DIAGNOSIS — R10.84 ABDOMINAL CRAMPING, GENERALIZED: ICD-10-CM

## 2022-08-22 PROCEDURE — 99214 OFFICE O/P EST MOD 30 MIN: CPT | Performed by: NURSE PRACTITIONER

## 2022-08-22 RX ORDER — FLUTICASONE PROPIONATE 50 UG/1
SPRAY, METERED NASAL
Qty: 0 | Refills: 0 | Status: ACTIVE | COMMUNITY
Start: 1900-01-01

## 2022-08-22 RX ORDER — LEVOTHYROXINE SODIUM 0.05 MG/1
TABLET ORAL
Qty: 0 | Refills: 0 | Status: ACTIVE | COMMUNITY
Start: 1900-01-01

## 2022-08-22 RX ORDER — MERCAPTOPURINE 50 MG/1
1 TABLETS TABLET ORAL ONCE DAILY
Qty: 90 | Refills: 3 | Status: ACTIVE | COMMUNITY

## 2022-08-22 RX ORDER — MESALAMINE 800 MG/1
2 TABLETS TABLET, DELAYED RELEASE ORAL THREE TIMES A DAY
Qty: 540 TABLETS | Refills: 3 | Status: ACTIVE | COMMUNITY

## 2022-08-22 RX ORDER — ESTRADIOL 0.1 MG/G
CREAM VAGINAL
Qty: 0 | Refills: 0 | Status: ACTIVE | COMMUNITY
Start: 1900-01-01

## 2022-08-22 RX ORDER — MESALAMINE 800 MG/1
2 TABLETS TABLET, DELAYED RELEASE ORAL THREE TIMES A DAY
Qty: 540 TABLETS | Refills: 3

## 2022-08-22 RX ORDER — MERCAPTOPURINE 50 MG/1
1 TABLETS TABLET ORAL ONCE DAILY
Qty: 90 TABLETS | Refills: 3

## 2022-08-22 RX ORDER — PREDNISONE 5 MG/1
TAKE 4 PILLS BY MOUTH DAILY FOR 1 WEEK, 3 DAILY FOR A WEEK, 2 DAILY FOR A WEEK, 1 DAILY FOR A WEEK TABLET ORAL
Qty: 100 | Refills: 3 | Status: ON HOLD | COMMUNITY
Start: 2019-09-12

## 2022-08-22 NOTE — HPI-OTHER HISTORIES
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  She is currently on mercaptopurine 50 mg daily Asacol.  She had been on a 100 but lab work revealed mild leukopenia and lymphocytopenia as well as a rising bilirubin so that mercaptopurine was stopped for a while and then started back in 50 mg.  Labs have normalized.  She is followed at the hematology clinic for this. In terms of the Crohn's disease she is, overall, doing well.  She does state that since January 1 she has had 9 episodes of rectal bleeding.  This is enough to discolor the water in the commode.  Typically this bleeding is with passage of a hard stool.  She states that she has 1-3 formed stools daily.  At times they are hard and she has to strain.  She has no rectal pain, burning or itching.  She is not aware of hemorrhoids.  She has no abdominal pain.  There is some discomfort if she has a great deal of gas.  She has no fever, chills or night sweats.  She denies extraintestinal manifestations. She has no upper GI symptoms.   2/22/2022 Halina is a very pleasant 77 YO F with Crohns maintained on mercaptopurine 50mg once daily and Asacol who presents for follow up. She had labs in December with [facility] [place] [facility] and brings them in for review today. WBC slightly down at 4.2 with lymphocytes of 900. Hgb WNL and LFTS including bili WNL.  She has normal stools with occasional constipation. She did not want to take the fiber but is doing well with prunes in AM and occasionally in the evening as well. Denies abdominal pain, rectal bleeding, black stools, nausea, vomiting.  She follows at [facility] for her MARIA INES and gets injectafer PRN. Also has B12 deficiency on B12 injections monthly, prescribed by [facility].  Her last colonoscopy was 3/2021 with a small TA in the ascending colon, lipoma in ascending colon, left sided diverticulosis. TI was normal. Random colon bx were unremarkable. Due for repeat 3/2024. TG

## 2022-08-22 NOTE — HPI-TODAY'S VISIT:
8/22/2022 Halina is here for f/u of Crohns maintained on mercaptopurine 50mg once daily and Asacol 2 tablets TID. She was last seen in February by Sary Prabhakar NP. Her last colonoscopy was 2021 with normal TI and random bx negative. She denies any constipation or diarrhea. She denies any blood in her stool.  She has been followed by [facility] and her WBC and LFTs have been normal. Her ferritin is down slightly but iron normal. Overall, she feels well today. CS

## 2022-10-18 ENCOUNTER — TELEPHONE ENCOUNTER (OUTPATIENT)
Dept: URBAN - NONMETROPOLITAN AREA CLINIC 13 | Facility: CLINIC | Age: 77
End: 2022-10-18

## 2022-10-25 ENCOUNTER — LAB OUTSIDE AN ENCOUNTER (OUTPATIENT)
Dept: URBAN - NONMETROPOLITAN AREA CLINIC 2 | Facility: CLINIC | Age: 77
End: 2022-10-25

## 2022-10-25 ENCOUNTER — OFFICE VISIT (OUTPATIENT)
Dept: URBAN - NONMETROPOLITAN AREA CLINIC 13 | Facility: CLINIC | Age: 77
End: 2022-10-25
Payer: MEDICARE

## 2022-10-25 VITALS
BODY MASS INDEX: 36 KG/M2 | HEIGHT: 66 IN | WEIGHT: 224 LBS | HEART RATE: 69 BPM | DIASTOLIC BLOOD PRESSURE: 77 MMHG | SYSTOLIC BLOOD PRESSURE: 129 MMHG

## 2022-10-25 DIAGNOSIS — Z12.11 SCREENING FOR COLON CANCER: ICD-10-CM

## 2022-10-25 DIAGNOSIS — Z51.81 THERAPEUTIC DRUG MONITORING: ICD-10-CM

## 2022-10-25 DIAGNOSIS — Z86.010 PERSONAL HISTORY OF COLONIC POLYPS: ICD-10-CM

## 2022-10-25 DIAGNOSIS — K50.90 CROHN'S DISEASE: ICD-10-CM

## 2022-10-25 DIAGNOSIS — Z86.19 HISTORY OF CLOSTRIDIUM DIFFICILE COLITIS: ICD-10-CM

## 2022-10-25 DIAGNOSIS — R10.9 ABDOMINAL PAIN: ICD-10-CM

## 2022-10-25 LAB
C-REACTIVE PROTEIN: 1.7
HEMATOCRIT: 38.9
HEMOGLOBIN: 12.9
MEAN CORPUSCULAR HEMOGLOBIN CONC: 33.2
MEAN CORPUSCULAR HEMOGLOBIN: 32
MEAN CORPUSCULAR VOLUME: 96.4
MEAN PLATELET VOLUME: 8.1
PLATELET COUNT: 228
RED BLOOD CELL COUNT: 4.03
RED CELL DISTRIBUTION WIDTH: 14.7
SEDIMENTATION RATE, ERYTHROCYTE: 36
WHITE BLOOD CELL COUNT: 7.3

## 2022-10-25 PROCEDURE — 99214 OFFICE O/P EST MOD 30 MIN: CPT | Performed by: NURSE PRACTITIONER

## 2022-10-25 RX ORDER — MERCAPTOPURINE 50 MG/1
1 TABLETS TABLET ORAL ONCE DAILY
Qty: 90 TABLETS | Refills: 3

## 2022-10-25 RX ORDER — MESALAMINE 800 MG/1
2 TABLETS TABLET, DELAYED RELEASE ORAL THREE TIMES A DAY
Qty: 540 TABLETS | Refills: 3 | Status: ACTIVE | COMMUNITY

## 2022-10-25 RX ORDER — MESALAMINE 800 MG/1
2 TABLETS TABLET, DELAYED RELEASE ORAL THREE TIMES A DAY
Qty: 540 TABLETS | Refills: 3

## 2022-10-25 RX ORDER — LEVOTHYROXINE SODIUM 0.05 MG/1
TABLET ORAL
Qty: 0 | Refills: 0 | Status: ACTIVE | COMMUNITY
Start: 1900-01-01

## 2022-10-25 RX ORDER — CHOLESTYRAMINE 4 G/9G
1 PACKET MIXED WITH WATER OR NON-CARBONATED DRINK POWDER, FOR SUSPENSION ORAL THREE TIMES A DAY
Qty: 90 | Refills: 6 | OUTPATIENT
Start: 2022-10-25

## 2022-10-25 RX ORDER — MERCAPTOPURINE 50 MG/1
1 TABLETS TABLET ORAL ONCE DAILY
Qty: 90 TABLETS | Refills: 3 | Status: ACTIVE | COMMUNITY

## 2022-10-25 RX ORDER — PREDNISONE 5 MG/1
TAKE 4 PILLS BY MOUTH DAILY FOR 1 WEEK, 3 DAILY FOR A WEEK, 2 DAILY FOR A WEEK, 1 DAILY FOR A WEEK TABLET ORAL
Qty: 100 | Refills: 3 | Status: ON HOLD | COMMUNITY
Start: 2019-09-12

## 2022-10-25 RX ORDER — FLUTICASONE PROPIONATE 50 UG/1
SPRAY, METERED NASAL
Qty: 0 | Refills: 0 | Status: ACTIVE | COMMUNITY
Start: 1900-01-01

## 2022-10-25 RX ORDER — DICYCLOMINE HYDROCHLORIDE 10 MG/1
1 TABLET 30 MINS PRIOR TO A MEAL FOR AND AT BEDTIME CRAMPING/DIARRHEA CAPSULE ORAL
Qty: 120 | Refills: 6 | OUTPATIENT
Start: 2022-10-25 | End: 2023-05-23

## 2022-10-25 RX ORDER — ESTRADIOL 0.1 MG/G
CREAM VAGINAL
Qty: 0 | Refills: 0 | Status: ACTIVE | COMMUNITY
Start: 1900-01-01

## 2022-10-25 NOTE — HPI-TODAY'S VISIT:
10/25/2022 Halina is here for possible Crohns flare. She was last seen in August and doing well on mercaptopurine 50mg once daily and Asacol 2 tablets TID. She was given doxycycline for rosacea. She started having diarrhea and had to stop taking it. Her diarrhea has continued 5-6 times a day with leftsided cramping. She has had some mucous and blood in her stool. She has a hx of Cdiff. Her last colonoscopy was 2021 with normal TI and random bx negative. Her prior colonoscopy showed leftsided disease. CS

## 2022-10-25 NOTE — HPI-OTHER HISTORIES
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  She is currently on mercaptopurine 50 mg daily Asacol.  She had been on a 100 but lab work revealed mild leukopenia and lymphocytopenia as well as a rising bilirubin so that mercaptopurine was stopped for a while and then started back in 50 mg.  Labs have normalized.  She is followed at the hematology clinic for this. In terms of the Crohn's disease she is, overall, doing well.  She does state that since January 1 she has had 9 episodes of rectal bleeding.  This is enough to discolor the water in the commode.  Typically this bleeding is with passage of a hard stool.  She states that she has 1-3 formed stools daily.  At times they are hard and she has to strain.  She has no rectal pain, burning or itching.  She is not aware of hemorrhoids.  She has no abdominal pain.  There is some discomfort if she has a great deal of gas.  She has no fever, chills or night sweats.  She denies extraintestinal manifestations. She has no upper GI symptoms.   2/22/2022 Halina is a very pleasant 77 YO F with Crohns maintained on mercaptopurine 50mg once daily and Asacol who presents for follow up. She had labs in December with [facility] and brings them in for review today. WBC slightly down at 4.2 with lymphocytes of 900. Hgb WNL and LFTS including bili WNL.  She has normal stools with occasional constipation. She did not want to take the fiber but is doing well with prunes in AM and occasionally in the evening as well. Denies abdominal pain, rectal bleeding, black stools, nausea, vomiting.  She follows at [facility] for her MARIA INES and gets injectafer PRN. Also has B12 deficiency on B12 injections monthly, prescribed by [facility].  Her last colonoscopy was 3/2021 with a small TA in the ascending colon, lipoma in ascending colon, left sided diverticulosis. TI was normal. Random colon bx were unremarkable. Due for repeat 3/2024. TG  8/22/2022 Halina is here for f/u of Crohns maintained on mercaptopurine 50mg once daily and Asacol 2 tablets TID. She was last seen in February by Sary Prabhakar NP. Her last colonoscopy was 2021 with normal TI and random bx negative. She denies any constipation or diarrhea. She denies any blood in her stool.  She has been followed by [facility] and her WBC and LFTs have been normal. Her ferritin is down slightly but iron normal. Overall, she feels well today. CS

## 2022-10-26 ENCOUNTER — LAB OUTSIDE AN ENCOUNTER (OUTPATIENT)
Dept: URBAN - NONMETROPOLITAN AREA CLINIC 2 | Facility: CLINIC | Age: 77
End: 2022-10-26

## 2022-11-01 ENCOUNTER — TELEPHONE ENCOUNTER (OUTPATIENT)
Dept: URBAN - METROPOLITAN AREA CLINIC 92 | Facility: CLINIC | Age: 77
End: 2022-11-01

## 2022-11-01 LAB — GASTROINTESTINAL PATHOGEN: (no result)

## 2022-11-01 RX ORDER — MERCAPTOPURINE 50 MG/1
1 TABLETS TABLET ORAL ONCE DAILY
Qty: 90 TABLETS | Refills: 3 | Status: ACTIVE | COMMUNITY

## 2022-11-01 RX ORDER — MESALAMINE 800 MG/1
2 TABLETS TABLET, DELAYED RELEASE ORAL THREE TIMES A DAY
Qty: 540 TABLETS | Refills: 3 | Status: ACTIVE | COMMUNITY

## 2022-11-01 RX ORDER — CHOLESTYRAMINE 4 G/9G
1 PACKET MIXED WITH WATER OR NON-CARBONATED DRINK POWDER, FOR SUSPENSION ORAL THREE TIMES A DAY
Qty: 90 | Refills: 6 | Status: ACTIVE | COMMUNITY
Start: 2022-10-25

## 2022-11-01 RX ORDER — BUDESONIDE 3 MG/1
3 CAPSULES CAPSULE ORAL ONCE A DAY
Qty: 90 | Refills: 2 | OUTPATIENT
Start: 2022-11-01 | End: 2023-01-29

## 2022-11-01 RX ORDER — PREDNISONE 5 MG/1
TAKE 4 PILLS BY MOUTH DAILY FOR 1 WEEK, 3 DAILY FOR A WEEK, 2 DAILY FOR A WEEK, 1 DAILY FOR A WEEK TABLET ORAL
Qty: 100 | Refills: 3 | Status: ON HOLD | COMMUNITY
Start: 2019-09-12

## 2022-11-01 RX ORDER — ESTRADIOL 0.1 MG/G
CREAM VAGINAL
Qty: 0 | Refills: 0 | Status: ACTIVE | COMMUNITY
Start: 1900-01-01

## 2022-11-01 RX ORDER — DICYCLOMINE HYDROCHLORIDE 10 MG/1
1 TABLET 30 MINS PRIOR TO A MEAL FOR AND AT BEDTIME CRAMPING/DIARRHEA CAPSULE ORAL
Qty: 120 | Refills: 6 | Status: ACTIVE | COMMUNITY
Start: 2022-10-25 | End: 2023-05-23

## 2022-11-01 RX ORDER — FLUTICASONE PROPIONATE 50 UG/1
SPRAY, METERED NASAL
Qty: 0 | Refills: 0 | Status: ACTIVE | COMMUNITY
Start: 1900-01-01

## 2022-11-01 RX ORDER — PREDNISONE 10 MG/1
4 TABLET QD FOR 7DAY, 3 TABLET QD FOR 7 DAYS, 2TABLET QD  7 DAYS, 1 TABLET QD FOR 7 DAYS TABLET ORAL ONCE A DAY
Qty: 70 | Refills: 0 | OUTPATIENT
Start: 2022-11-08 | End: 2022-12-06

## 2022-11-01 RX ORDER — LEVOTHYROXINE SODIUM 0.05 MG/1
TABLET ORAL
Qty: 0 | Refills: 0 | Status: ACTIVE | COMMUNITY
Start: 1900-01-01

## 2022-12-05 ENCOUNTER — TELEPHONE ENCOUNTER (OUTPATIENT)
Dept: URBAN - NONMETROPOLITAN AREA CLINIC 2 | Facility: CLINIC | Age: 77
End: 2022-12-05

## 2023-02-23 ENCOUNTER — CLAIMS CREATED FROM THE CLAIM WINDOW (OUTPATIENT)
Dept: URBAN - NONMETROPOLITAN AREA CLINIC 13 | Facility: CLINIC | Age: 78
End: 2023-02-23
Payer: MEDICARE

## 2023-02-23 ENCOUNTER — WEB ENCOUNTER (OUTPATIENT)
Dept: URBAN - NONMETROPOLITAN AREA CLINIC 13 | Facility: CLINIC | Age: 78
End: 2023-02-23

## 2023-02-23 ENCOUNTER — TELEPHONE ENCOUNTER (OUTPATIENT)
Dept: URBAN - METROPOLITAN AREA CLINIC 92 | Facility: CLINIC | Age: 78
End: 2023-02-23

## 2023-02-23 VITALS
TEMPERATURE: 98.2 F | HEART RATE: 73 BPM | BODY MASS INDEX: 35.03 KG/M2 | HEIGHT: 66 IN | DIASTOLIC BLOOD PRESSURE: 74 MMHG | WEIGHT: 218 LBS | SYSTOLIC BLOOD PRESSURE: 143 MMHG

## 2023-02-23 DIAGNOSIS — R10.84 ABDOMINAL CRAMPING, GENERALIZED: ICD-10-CM

## 2023-02-23 DIAGNOSIS — K50.80 CROHN'S COLITIS: ICD-10-CM

## 2023-02-23 DIAGNOSIS — R10.9 ABDOMINAL PAIN: ICD-10-CM

## 2023-02-23 PROBLEM — 428283002: Status: ACTIVE | Noted: 2021-05-24

## 2023-02-23 PROCEDURE — 99214 OFFICE O/P EST MOD 30 MIN: CPT | Performed by: NURSE PRACTITIONER

## 2023-02-23 RX ORDER — DICYCLOMINE HYDROCHLORIDE 10 MG/1
1 TABLET 30 MINS PRIOR TO A MEAL FOR AND AT BEDTIME CRAMPING/DIARRHEA CAPSULE ORAL
Qty: 120 | Refills: 6 | Status: ACTIVE | COMMUNITY
Start: 2022-10-25 | End: 2023-05-23

## 2023-02-23 RX ORDER — PREDNISONE 5 MG/1
TAKE 4 PILLS BY MOUTH DAILY FOR 1 WEEK, 3 DAILY FOR A WEEK, 2 DAILY FOR A WEEK, 1 DAILY FOR A WEEK TABLET ORAL
Qty: 100 | Refills: 3 | Status: ON HOLD | COMMUNITY
Start: 2019-09-12

## 2023-02-23 RX ORDER — FLUTICASONE PROPIONATE 50 UG/1
SPRAY, METERED NASAL
Qty: 0 | Refills: 0 | Status: ACTIVE | COMMUNITY
Start: 1900-01-01

## 2023-02-23 RX ORDER — CHOLESTYRAMINE 4 G/9G
1 PACKET MIXED WITH WATER OR NON-CARBONATED DRINK POWDER, FOR SUSPENSION ORAL THREE TIMES A DAY
Qty: 90 | Refills: 6 | Status: ACTIVE | COMMUNITY
Start: 2022-10-25

## 2023-02-23 RX ORDER — MESALAMINE 800 MG/1
2 TABLETS TABLET, DELAYED RELEASE ORAL THREE TIMES A DAY
Qty: 540 TABLETS | Refills: 3

## 2023-02-23 RX ORDER — MERCAPTOPURINE 50 MG/1
1 TABLETS TABLET ORAL ONCE DAILY
Qty: 90 TABLETS | Refills: 3 | Status: ACTIVE | COMMUNITY

## 2023-02-23 RX ORDER — LEVOTHYROXINE SODIUM 0.05 MG/1
TABLET ORAL
Qty: 0 | Refills: 0 | Status: ACTIVE | COMMUNITY
Start: 1900-01-01

## 2023-02-23 RX ORDER — ESTRADIOL 0.1 MG/G
CREAM VAGINAL
Qty: 0 | Refills: 0 | Status: ACTIVE | COMMUNITY
Start: 1900-01-01

## 2023-02-23 RX ORDER — MERCAPTOPURINE 50 MG/1
1 TABLETS TABLET ORAL ONCE DAILY
Qty: 90 TABLETS | Refills: 3

## 2023-02-23 RX ORDER — MESALAMINE 800 MG/1
2 TABLETS TABLET, DELAYED RELEASE ORAL THREE TIMES A DAY
Qty: 540 TABLETS | Refills: 3 | Status: ACTIVE | COMMUNITY

## 2023-02-23 NOTE — HPI-OTHER HISTORIES
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  She is currently on mercaptopurine 50 mg daily Asacol.  She had been on a 100 but lab work revealed mild leukopenia and lymphocytopenia as well as a rising bilirubin so that mercaptopurine was stopped for a while and then started back in 50 mg.  Labs have normalized.  She is followed at the hematology clinic for this. In terms of the Crohn's disease she is, overall, doing well.  She does state that since January 1 she has had 9 episodes of rectal bleeding.  This is enough to discolor the water in the commode.  Typically this bleeding is with passage of a hard stool.  She states that she has 1-3 formed stools daily.  At times they are hard and she has to strain.  She has no rectal pain, burning or itching.  She is not aware of hemorrhoids.  She has no abdominal pain.  There is some discomfort if she has a great deal of gas.  She has no fever, chills or night sweats.  She denies extraintestinal manifestations. She has no upper GI symptoms.   2/22/2022 Halina is a very pleasant 77 YO F with Crohns maintained on mercaptopurine 50mg once daily and Asacol who presents for follow up. She had labs in December with [facility] and brings them in for review today. WBC slightly down at 4.2 with lymphocytes of 900. Hgb WNL and LFTS including bili WNL.  She has normal stools with occasional constipation. She did not want to take the fiber but is doing well with prunes in AM and occasionally in the evening as well. Denies abdominal pain, rectal bleeding, black stools, nausea, vomiting.  She follows at [facility] for her MARIA INES and gets injectafer PRN. Also has B12 deficiency on B12 injections monthly, prescribed by [facility].  Her last colonoscopy was 3/2021 with a small TA in the ascending colon, lipoma in ascending colon, left sided diverticulosis. TI was normal. Random colon bx were unremarkable. Due for repeat 3/2024. TG  8/22/2022 Halina is here for f/u of Crohns maintained on mercaptopurine 50mg once daily and Asacol 2 tablets TID. She was last seen in February by Sary Prabhakar NP. Her last colonoscopy was 2021 with normal TI and random bx negative. She denies any constipation or diarrhea. She denies any blood in her stool.  She has been followed by [facility] and her WBC and LFTs have been normal. Her ferritin is down slightly but iron normal. Overall, she feels well today. CS  10/25/2022 Halina is here for possible Crohns flare. She was last seen in August and doing well on mercaptopurine 50mg once daily and Asacol 2 tablets TID. She was given doxycycline for rosacea. She started having diarrhea and had to stop taking it. Her diarrhea has continued 5-6 times a day with leftsided cramping. She has had some mucous and blood in her stool. She has a hx of Cdiff. Her last colonoscopy was 2021 with normal TI and random bx negative. Her prior colonoscopy showed leftsided disease. CS

## 2023-02-23 NOTE — HPI-TODAY'S VISIT:
2/23/2023 Halina is here for f/u of Crohns flare. She had been well controlled on mercaptopurine 50mg once daily and Asacol 2 tablets TID. She was given doxycycline for rosacea. She started having diarrhea and had to stop taking it. Her diarrhea has continued 5-6 times a day with leftsided cramping with blood and mucous. She was given bentyl and questran until her stool studies returned. Her diarrhea got worse with more bleeding on the bentyl and questran. She stopped taking it and her stool studies returned negative so she was given uceris. This did not get her symptoms undercontrol. She was given a prednisone taper. After starting this, her bowels improved but her reflux got worse making her asthma worse and gave her a sinus infection. She was unable to lay in her bed due to difficulty breathing so she slept in her chair. She did not put her feet up and developed a clot in her left leg. She is now on eliquis.  Her main concern today is hair loss. She is seeing derm for this soon. her PCP feels stress is a contributing factor. CS

## 2023-06-22 ENCOUNTER — CLAIMS CREATED FROM THE CLAIM WINDOW (OUTPATIENT)
Dept: URBAN - NONMETROPOLITAN AREA CLINIC 13 | Facility: CLINIC | Age: 78
End: 2023-06-22
Payer: MEDICARE

## 2023-06-22 ENCOUNTER — TELEPHONE ENCOUNTER (OUTPATIENT)
Dept: URBAN - NONMETROPOLITAN AREA CLINIC 2 | Facility: CLINIC | Age: 78
End: 2023-06-22

## 2023-06-22 VITALS
SYSTOLIC BLOOD PRESSURE: 147 MMHG | BODY MASS INDEX: 35.52 KG/M2 | HEART RATE: 63 BPM | HEIGHT: 66 IN | TEMPERATURE: 98.1 F | DIASTOLIC BLOOD PRESSURE: 82 MMHG | WEIGHT: 221 LBS

## 2023-06-22 DIAGNOSIS — K50.80 CROHN'S COLITIS: ICD-10-CM

## 2023-06-22 DIAGNOSIS — Z86.19 HISTORY OF CLOSTRIDIUM DIFFICILE COLITIS: ICD-10-CM

## 2023-06-22 DIAGNOSIS — Z86.010 PERSONAL HISTORY OF COLONIC POLYPS: ICD-10-CM

## 2023-06-22 DIAGNOSIS — R10.84 ABDOMINAL CRAMPING, GENERALIZED: ICD-10-CM

## 2023-06-22 DIAGNOSIS — Z51.81 THERAPEUTIC DRUG MONITORING: ICD-10-CM

## 2023-06-22 DIAGNOSIS — Z12.11 SCREENING FOR COLON CANCER: ICD-10-CM

## 2023-06-22 PROBLEM — 305058001: Status: ACTIVE | Noted: 2020-12-01

## 2023-06-22 PROCEDURE — 99214 OFFICE O/P EST MOD 30 MIN: CPT | Performed by: NURSE PRACTITIONER

## 2023-06-22 RX ORDER — ESTRADIOL 0.1 MG/G
CREAM VAGINAL
Qty: 0 | Refills: 0 | Status: ACTIVE | COMMUNITY
Start: 1900-01-01

## 2023-06-22 RX ORDER — PREDNISONE 5 MG/1
TAKE 4 PILLS BY MOUTH DAILY FOR 1 WEEK, 3 DAILY FOR A WEEK, 2 DAILY FOR A WEEK, 1 DAILY FOR A WEEK TABLET ORAL
Qty: 100 | Refills: 3 | Status: ON HOLD | COMMUNITY
Start: 2019-09-12

## 2023-06-22 RX ORDER — CHOLESTYRAMINE 4 G/9G
1 PACKET MIXED WITH WATER OR NON-CARBONATED DRINK POWDER, FOR SUSPENSION ORAL THREE TIMES A DAY
Qty: 90 | Refills: 6 | Status: ACTIVE | COMMUNITY
Start: 2022-10-25

## 2023-06-22 RX ORDER — MERCAPTOPURINE 50 MG/1
1 TABLETS TABLET ORAL ONCE DAILY
Qty: 90 TABLETS | Refills: 3 | Status: ACTIVE | COMMUNITY

## 2023-06-22 RX ORDER — LEVOTHYROXINE SODIUM 0.05 MG/1
TABLET ORAL
Qty: 0 | Refills: 0 | Status: ACTIVE | COMMUNITY
Start: 1900-01-01

## 2023-06-22 RX ORDER — MESALAMINE 800 MG/1
2 TABLETS TABLET, DELAYED RELEASE ORAL THREE TIMES A DAY
Qty: 540 TABLETS | Refills: 3

## 2023-06-22 RX ORDER — MERCAPTOPURINE 50 MG/1
1 TABLETS TABLET ORAL ONCE DAILY
Qty: 90 TABLETS | Refills: 3

## 2023-06-22 RX ORDER — MESALAMINE 800 MG/1
2 TABLETS TABLET, DELAYED RELEASE ORAL THREE TIMES A DAY
Qty: 540 TABLETS | Refills: 3 | Status: ACTIVE | COMMUNITY

## 2023-06-22 RX ORDER — FLUTICASONE PROPIONATE 50 UG/1
SPRAY, METERED NASAL
Qty: 0 | Refills: 0 | Status: ACTIVE | COMMUNITY
Start: 1900-01-01

## 2023-06-22 RX ORDER — MESALAMINE 800 MG/1
2 TABLETS TABLET, DELAYED RELEASE ORAL THREE TIMES A DAY
Qty: 540 TABLETS | Refills: 3
End: 2024-06-21

## 2023-06-22 NOTE — HPI-OTHER HISTORIES
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  She is currently on mercaptopurine 50 mg daily Asacol.  She had been on a 100 but lab work revealed mild leukopenia and lymphocytopenia as well as a rising bilirubin so that mercaptopurine was stopped for a while and then started back in 50 mg.  Labs have normalized.  She is followed at the hematology clinic for this. In terms of the Crohn's disease she is, overall, doing well.  She does state that since January 1 she has had 9 episodes of rectal bleeding.  This is enough to discolor the water in the commode.  Typically this bleeding is with passage of a hard stool.  She states that she has 1-3 formed stools daily.  At times they are hard and she has to strain.  She has no rectal pain, burning or itching.  She is not aware of hemorrhoids.  She has no abdominal pain.  There is some discomfort if she has a great deal of gas.  She has no fever, chills or night sweats.  She denies extraintestinal manifestations. She has no upper GI symptoms.   2/22/2022 Halina is a very pleasant 77 YO F with Crohns maintained on mercaptopurine 50mg once daily and Asacol who presents for follow up. She had labs in December with [facility] and brings them in for review today. WBC slightly down at 4.2 with lymphocytes of 900. Hgb WNL and LFTS including bili WNL.  She has normal stools with occasional constipation. She did not want to take the fiber but is doing well with prunes in AM and occasionally in the evening as well. Denies abdominal pain, rectal bleeding, black stools, nausea, vomiting.  She follows at [facility] for her MARIA INES and gets injectafer PRN. Also has B12 deficiency on B12 injections monthly, prescribed by [facility].  Her last colonoscopy was 3/2021 with a small TA in the ascending colon, lipoma in ascending colon, left sided diverticulosis. TI was normal. Random colon bx were unremarkable. Due for repeat 3/2024. TG  8/22/2022 Halina is here for f/u of Crohns maintained on mercaptopurine 50mg once daily and Asacol 2 tablets TID. She was last seen in February by Sary Prabhakar NP. Her last colonoscopy was 2021 with normal TI and random bx negative. She denies any constipation or diarrhea. She denies any blood in her stool.  She has been followed by [facility] and her WBC and LFTs have been normal. Her ferritin is down slightly but iron normal. Overall, she feels well today. CS  10/25/2022 Halina is here for possible Crohns flare. She was last seen in August and doing well on mercaptopurine 50mg once daily and Asacol 2 tablets TID. She was given doxycycline for rosacea. She started having diarrhea and had to stop taking it. Her diarrhea has continued 5-6 times a day with leftsided cramping. She has had some mucous and blood in her stool. She has a hx of Cdiff. Her last colonoscopy was 2021 with normal TI and random bx negative. Her prior colonoscopy showed leftsided disease. CS  2/23/2023 Halina is here for f/u of Crohns flare. She had been well controlled on mercaptopurine 50mg once daily and Asacol 2 tablets TID. She was given doxycycline for rosacea. She started having diarrhea and had to stop taking it. Her diarrhea has continued 5-6 times a day with leftsided cramping with blood and mucous. She was given bentyl and questran until her stool studies returned. Her diarrhea got worse with more bleeding on the bentyl and questran. She stopped taking it and her stool studies returned negative so she was given uceris. This did not get her symptoms undercontrol. She was given a prednisone taper. After starting this, her bowels improved but her reflux got worse making her asthma worse and gave her a sinus infection. She was unable to lay in her bed due to difficulty breathing so she slept in her chair. She did not put her feet up and developed a clot in her left leg. She is now on eliquis.  Her main concern today is hair loss. She is seeing derm for this soon. her PCP feels stress is a contributing factor.

## 2023-06-22 NOTE — HPI-TODAY'S VISIT:
6/22/2023 Halina is here for f/u of Crohns flare. She had been well controlled on mercaptopurine 50mg once daily and Asacol 2 tablets TID. She was given doxycycline for rosacea last year turning into a terrible flare taking months to get back under control. Today, her bowels are regular. She denies any blood in her stool except once after eating quinoa. She denies any urgency, frequency, or abdominal pain. She remains on Eliquis and hopes to finish up in 3 months. Her labs have remained fairly stable. She is getting an iron infusion soon. CS

## 2023-06-27 ENCOUNTER — TELEPHONE ENCOUNTER (OUTPATIENT)
Dept: URBAN - NONMETROPOLITAN AREA CLINIC 2 | Facility: CLINIC | Age: 78
End: 2023-06-27

## 2023-07-10 ENCOUNTER — TELEPHONE ENCOUNTER (OUTPATIENT)
Dept: URBAN - NONMETROPOLITAN AREA CLINIC 2 | Facility: CLINIC | Age: 78
End: 2023-07-10

## 2023-07-10 RX ORDER — MESALAMINE 1.2 G/1
2 TABLETS WITH A MEAL TABLET, DELAYED RELEASE ORAL ONCE A DAY
Qty: 180 TABLET | Refills: 3 | OUTPATIENT
Start: 2023-07-11 | End: 2024-07-05

## 2023-12-19 ENCOUNTER — OFFICE VISIT (OUTPATIENT)
Dept: URBAN - NONMETROPOLITAN AREA CLINIC 13 | Facility: CLINIC | Age: 78
End: 2023-12-19
Payer: MEDICARE

## 2023-12-19 ENCOUNTER — LAB OUTSIDE AN ENCOUNTER (OUTPATIENT)
Dept: URBAN - NONMETROPOLITAN AREA CLINIC 13 | Facility: CLINIC | Age: 78
End: 2023-12-19

## 2023-12-19 ENCOUNTER — TELEPHONE ENCOUNTER (OUTPATIENT)
Dept: URBAN - NONMETROPOLITAN AREA CLINIC 13 | Facility: CLINIC | Age: 78
End: 2023-12-19

## 2023-12-19 VITALS
BODY MASS INDEX: 35.07 KG/M2 | SYSTOLIC BLOOD PRESSURE: 172 MMHG | DIASTOLIC BLOOD PRESSURE: 71 MMHG | HEIGHT: 66 IN | HEART RATE: 65 BPM | WEIGHT: 218.2 LBS

## 2023-12-19 DIAGNOSIS — Z12.11 SCREENING FOR COLON CANCER: ICD-10-CM

## 2023-12-19 DIAGNOSIS — Z86.19 HISTORY OF CLOSTRIDIUM DIFFICILE COLITIS: ICD-10-CM

## 2023-12-19 DIAGNOSIS — Z86.010 PERSONAL HISTORY OF COLONIC POLYPS: ICD-10-CM

## 2023-12-19 DIAGNOSIS — R10.9 ABDOMINAL PAIN: ICD-10-CM

## 2023-12-19 DIAGNOSIS — R10.84 ABDOMINAL CRAMPING, GENERALIZED: ICD-10-CM

## 2023-12-19 DIAGNOSIS — Z51.81 THERAPEUTIC DRUG MONITORING: ICD-10-CM

## 2023-12-19 DIAGNOSIS — K50.80 CROHN'S COLITIS: ICD-10-CM

## 2023-12-19 DIAGNOSIS — K50.90 CROHN'S DISEASE: ICD-10-CM

## 2023-12-19 PROCEDURE — 99214 OFFICE O/P EST MOD 30 MIN: CPT | Performed by: NURSE PRACTITIONER

## 2023-12-19 RX ORDER — ESTRADIOL 0.1 MG/G
CREAM VAGINAL
Qty: 0 | Refills: 0 | Status: ACTIVE | COMMUNITY
Start: 1900-01-01

## 2023-12-19 RX ORDER — LEVOTHYROXINE SODIUM 0.05 MG/1
TABLET ORAL
Qty: 0 | Refills: 0 | Status: ACTIVE | COMMUNITY
Start: 1900-01-01

## 2023-12-19 RX ORDER — FLUTICASONE PROPIONATE 50 UG/1
SPRAY, METERED NASAL
Qty: 0 | Refills: 0 | Status: ACTIVE | COMMUNITY
Start: 1900-01-01

## 2023-12-19 RX ORDER — CHOLESTYRAMINE 4 G/9G
1 PACKET MIXED WITH WATER OR NON-CARBONATED DRINK POWDER, FOR SUSPENSION ORAL THREE TIMES A DAY
Qty: 90 | Refills: 6 | Status: ACTIVE | COMMUNITY
Start: 2022-10-25

## 2023-12-19 RX ORDER — MESALAMINE 800 MG/1
2 TABLETS TABLET, DELAYED RELEASE ORAL THREE TIMES A DAY
Qty: 540 TABLETS | Refills: 3

## 2023-12-19 RX ORDER — MERCAPTOPURINE 50 MG/1
1 TABLETS TABLET ORAL ONCE DAILY
Qty: 90 TABLETS | Refills: 3 | Status: ACTIVE | COMMUNITY

## 2023-12-19 RX ORDER — MESALAMINE 800 MG/1
2 TABLETS TABLET, DELAYED RELEASE ORAL THREE TIMES A DAY
Qty: 540 TABLETS | Refills: 3 | Status: ACTIVE | COMMUNITY
End: 2024-06-21

## 2023-12-19 RX ORDER — PREDNISONE 5 MG/1
TAKE 4 PILLS BY MOUTH DAILY FOR 1 WEEK, 3 DAILY FOR A WEEK, 2 DAILY FOR A WEEK, 1 DAILY FOR A WEEK TABLET ORAL
Qty: 100 | Refills: 3 | Status: ON HOLD | COMMUNITY
Start: 2019-09-12

## 2023-12-19 RX ORDER — MERCAPTOPURINE 50 MG/1
1 TABLETS TABLET ORAL ONCE DAILY
Qty: 90 TABLETS | Refills: 3

## 2023-12-19 RX ORDER — MESALAMINE 1.2 G/1
2 TABLETS WITH A MEAL TABLET, DELAYED RELEASE ORAL ONCE A DAY
Qty: 180 TABLET | Refills: 3 | Status: ACTIVE | COMMUNITY
Start: 2023-07-11 | End: 2024-07-05

## 2023-12-19 NOTE — HPI-TODAY'S VISIT:
12/19/2023 Halina is here for f/u of Crohns flare. She had been well controlled on mercaptopurine 50mg once daily and Asacol 2 tablets TID. She was given doxycycline for rosacea last year turning into a terrible flare taking months to get back under control. Her insurance then changed her asacol to lialda and it took another 6 weeks to get back on track. Today, her bowels are regular. She denies any blood, urgency, frequency, or abdominal pain. She has come off Eliquis. Her labs have remained fairly stable. CS

## 2023-12-19 NOTE — HPI-OTHER HISTORIES
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  She is currently on mercaptopurine 50 mg daily Asacol.  She had been on a 100 but lab work revealed mild leukopenia and lymphocytopenia as well as a rising bilirubin so that mercaptopurine was stopped for a while and then started back in 50 mg.  Labs have normalized.  She is followed at the hematology clinic for this. In terms of the Crohn's disease she is, overall, doing well.  She does state that since January 1 she has had 9 episodes of rectal bleeding.  This is enough to discolor the water in the commode.  Typically this bleeding is with passage of a hard stool.  She states that she has 1-3 formed stools daily.  At times they are hard and she has to strain.  She has no rectal pain, burning or itching.  She is not aware of hemorrhoids.  She has no abdominal pain.  There is some discomfort if she has a great deal of gas.  She has no fever, chills or night sweats.  She denies extraintestinal manifestations. She has no upper GI symptoms.   2/22/2022 Halina is a very pleasant 77 YO F with Crohns maintained on mercaptopurine 50mg once daily and Asacol who presents for follow up. She had labs in December with [facility] and brings them in for review today. WBC slightly down at 4.2 with lymphocytes of 900. Hgb WNL and LFTS including bili WNL.  She has normal stools with occasional constipation. She did not want to take the fiber but is doing well with prunes in AM and occasionally in the evening as well. Denies abdominal pain, rectal bleeding, black stools, nausea, vomiting.  She follows at [facility] for her MARIA INES and gets injectafer PRN. Also has B12 deficiency on B12 injections monthly, prescribed by [facility].  Her last colonoscopy was 3/2021 with a small TA in the ascending colon, lipoma in ascending colon, left sided diverticulosis. TI was normal. Random colon bx were unremarkable. Due for repeat 3/2024. TG  8/22/2022 Halina is here for f/u of Crohns maintained on mercaptopurine 50mg once daily and Asacol 2 tablets TID. She was last seen in February by Sary Prabhakar NP. Her last colonoscopy was 2021 with normal TI and random bx negative. She denies any constipation or diarrhea. She denies any blood in her stool.  She has been followed by [facility] and her WBC and LFTs have been normal. Her ferritin is down slightly but iron normal. Overall, she feels well today. CS  10/25/2022 Halina is here for possible Crohns flare. She was last seen in August and doing well on mercaptopurine 50mg once daily and Asacol 2 tablets TID. She was given doxycycline for rosacea. She started having diarrhea and had to stop taking it. Her diarrhea has continued 5-6 times a day with leftsided cramping. She has had some mucous and blood in her stool. She has a hx of Cdiff. Her last colonoscopy was 2021 with normal TI and random bx negative. Her prior colonoscopy showed leftsided disease. CS  2/23/2023 Halina is here for f/u of Crohns flare. She had been well controlled on mercaptopurine 50mg once daily and Asacol 2 tablets TID. She was given doxycycline for rosacea. She started having diarrhea and had to stop taking it. Her diarrhea has continued 5-6 times a day with leftsided cramping with blood and mucous. She was given bentyl and questran until her stool studies returned. Her diarrhea got worse with more bleeding on the bentyl and questran. She stopped taking it and her stool studies returned negative so she was given uceris. This did not get her symptoms undercontrol. She was given a prednisone taper. After starting this, her bowels improved but her reflux got worse making her asthma worse and gave her a sinus infection. She was unable to lay in her bed due to difficulty breathing so she slept in her chair. She did not put her feet up and developed a clot in her left leg. She is now on eliquis.  Her main concern today is hair loss. She is seeing derm for this soon. her PCP feels stress is a contributing factor. CS  6/22/2023 Halina is here for f/u of Crohns flare. She had been well controlled on mercaptopurine 50mg once daily and Asacol 2 tablets TID. She was given doxycycline for rosacea last year turning into a terrible flare taking months to get back under control. Today, her bowels are regular. She denies any blood in her stool except once after eating quinoa. She denies any urgency, frequency, or abdominal pain. She remains on Eliquis and hopes to finish up in 3 months. Her labs have remained fairly stable. She is getting an iron infusion soon. CS

## 2024-03-21 ENCOUNTER — OV EP (OUTPATIENT)
Dept: URBAN - NONMETROPOLITAN AREA CLINIC 13 | Facility: CLINIC | Age: 79
End: 2024-03-21
Payer: MEDICARE

## 2024-03-21 VITALS
BODY MASS INDEX: 36.03 KG/M2 | HEART RATE: 63 BPM | SYSTOLIC BLOOD PRESSURE: 136 MMHG | DIASTOLIC BLOOD PRESSURE: 78 MMHG | WEIGHT: 224.2 LBS | HEIGHT: 66 IN

## 2024-03-21 DIAGNOSIS — Z12.11 SCREENING FOR COLON CANCER: ICD-10-CM

## 2024-03-21 DIAGNOSIS — Z86.010 PERSONAL HISTORY OF COLONIC POLYPS: ICD-10-CM

## 2024-03-21 DIAGNOSIS — R10.9 ABDOMINAL PAIN: ICD-10-CM

## 2024-03-21 DIAGNOSIS — Z86.19 HISTORY OF CLOSTRIDIUM DIFFICILE COLITIS: ICD-10-CM

## 2024-03-21 DIAGNOSIS — Z51.81 THERAPEUTIC DRUG MONITORING: ICD-10-CM

## 2024-03-21 DIAGNOSIS — K50.90 CROHN'S DISEASE: ICD-10-CM

## 2024-03-21 PROCEDURE — 99214 OFFICE O/P EST MOD 30 MIN: CPT | Performed by: INTERNAL MEDICINE

## 2024-03-21 RX ORDER — MESALAMINE 1.2 G/1
2 TABLETS WITH A MEAL TABLET, DELAYED RELEASE ORAL ONCE A DAY
Qty: 180 TABLET | Refills: 3 | Status: ACTIVE | COMMUNITY
Start: 2023-07-11 | End: 2024-07-05

## 2024-03-21 RX ORDER — CHOLESTYRAMINE 4 G/9G
1 PACKET MIXED WITH WATER OR NON-CARBONATED DRINK POWDER, FOR SUSPENSION ORAL THREE TIMES A DAY
Qty: 90 | Refills: 6 | Status: ACTIVE | COMMUNITY
Start: 2022-10-25

## 2024-03-21 RX ORDER — PREDNISONE 5 MG/1
TAKE 4 PILLS BY MOUTH DAILY FOR 1 WEEK, 3 DAILY FOR A WEEK, 2 DAILY FOR A WEEK, 1 DAILY FOR A WEEK TABLET ORAL
Qty: 100 | Refills: 3 | Status: ON HOLD | COMMUNITY
Start: 2019-09-12

## 2024-03-21 RX ORDER — MESALAMINE 800 MG/1
2 TABLETS TABLET, DELAYED RELEASE ORAL THREE TIMES A DAY
Qty: 540 TABLETS | Refills: 3

## 2024-03-21 RX ORDER — ESTRADIOL 0.1 MG/G
CREAM VAGINAL
Qty: 0 | Refills: 0 | Status: ACTIVE | COMMUNITY
Start: 1900-01-01

## 2024-03-21 RX ORDER — MERCAPTOPURINE 50 MG/1
1 TABLETS TABLET ORAL ONCE DAILY
Qty: 90 TABLETS | Refills: 3

## 2024-03-21 RX ORDER — MERCAPTOPURINE 50 MG/1
1 TABLETS TABLET ORAL ONCE DAILY
Qty: 90 TABLETS | Refills: 3 | Status: ACTIVE | COMMUNITY

## 2024-03-21 RX ORDER — LEVOTHYROXINE SODIUM 0.05 MG/1
TABLET ORAL
Qty: 0 | Refills: 0 | Status: ACTIVE | COMMUNITY
Start: 1900-01-01

## 2024-03-21 RX ORDER — MESALAMINE 800 MG/1
2 TABLETS TABLET, DELAYED RELEASE ORAL THREE TIMES A DAY
Qty: 540 TABLETS | Refills: 3 | Status: ACTIVE | COMMUNITY

## 2024-03-21 RX ORDER — FLUTICASONE PROPIONATE 50 UG/1
SPRAY, METERED NASAL
Qty: 0 | Refills: 0 | Status: ACTIVE | COMMUNITY
Start: 1900-01-01

## 2024-03-21 NOTE — HPI-TODAY'S VISIT:
12/19/2023 Halina is here for f/u of Crohns flare. She had been well controlled on mercaptopurine 50mg once daily and Asacol 2 tablets TID. She was given doxycycline for rosacea last year turning into a terrible flare taking months to get back under control. Her insurance then changed her asacol to lialda and it took another 6 weeks to get back on track. Today, her bowels are regular. She denies any blood, urgency, frequency, or abdominal pain. She has come off Eliquis. Her labs have remained fairly stable. CS 3/21/2024 Halina returns for follow-up.  She had been seeing Dr. Donis haider to be prior to seeing Dianelys late 2023.  She has Crohn's disease on mercaptopurine and a sick call.  She had been doing well but had been given antibiotics for rosacea and since that time has had more issues with her bowels.  She currently she complains of bloating.  She has little diarrhea or rectal bleeding at this time.  She is scheduled for colonoscopy in approximately 3 weeks, April 15. She states that she has been on mercaptopurine for 10 years and wonders about the safety of this.  She also notes that she has been hesitant to initiating Biologics in the past and that is why she ultimately decided to remain on 6-mercaptopurine.

## 2024-03-21 NOTE — HPI-OTHER HISTORIES
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  She is currently on mercaptopurine 50 mg daily Asacol.  She had been on a 100 but lab work revealed mild leukopenia and lymphocytopenia as well as a rising bilirubin so that mercaptopurine was stopped for a while and then started back in 50 mg.  Labs have normalized.  She is followed at the hematology clinic for this. In terms of the Crohn's disease she is, overall, doing well.  She does state that since January 1 she has had 9 episodes of rectal bleeding.  This is enough to discolor the water in the commode.  Typically this bleeding is with passage of a hard stool.  She states that she has 1-3 formed stools daily.  At times they are hard and she has to strain.  She has no rectal pain, burning or itching.  She is not aware of hemorrhoids.  She has no abdominal pain.  There is some discomfort if she has a great deal of gas.  She has no fever, chills or night sweats.  She denies extraintestinal manifestations. She has no upper GI symptoms.   2/22/2022 Halina is a very pleasant 77 YO F with Crohns maintained on mercaptopurine 50mg once daily and Asacol who presents for follow up. She had labs in December with [facility] and brings them in for review today. WBC slightly down at 4.2 with lymphocytes of 900. Hgb WNL and LFTS including bili WNL.  She has normal stools with occasional constipation. She did not want to take the fiber but is doing well with prunes in AM and occasionally in the evening as well. Denies abdominal pain, rectal bleeding, black stools, nausea, vomiting.  She follows at [facility] for her MARIA INES and gets injectafer PRN. Also has B12 deficiency on B12 injections monthly, prescribed by [facility].  Her last colonoscopy was 3/2021 with a small TA in the ascending colon, lipoma in ascending colon, left sided diverticulosis. TI was normal. Random colon bx were unremarkable. Due for repeat 3/2024. TG  8/22/2022 Halina is here for f/u of Crohns maintained on mercaptopurine 50mg once daily and Asacol 2 tablets TID. She was last seen in February by Sary Prabhakar NP. Her last colonoscopy was 2021 with normal TI and random bx negative. She denies any constipation or diarrhea. She denies any blood in her stool.  She has been followed by [facility] and her WBC and LFTs have been normal. Her ferritin is down slightly but iron normal. Overall, she feels well today. CS  10/25/2022 Halina is here for possible Crohns flare. She was last seen in August and doing well on mercaptopurine 50mg once daily and Asacol 2 tablets TID. She was given doxycycline for rosacea. She started having diarrhea and had to stop taking it. Her diarrhea has continued 5-6 times a day with leftsided cramping. She has had some mucous and blood in her stool. She has a hx of Cdiff. Her last colonoscopy was 2021 with normal TI and random bx negative. Her prior colonoscopy showed leftsided disease. CS  2/23/2023 Halina is here for f/u of Crohns flare. She had been well controlled on mercaptopurine 50mg once daily and Asacol 2 tablets TID. She was given doxycycline for rosacea. She started having diarrhea and had to stop taking it. Her diarrhea has continued 5-6 times a day with leftsided cramping with blood and mucous. She was given bentyl and questran until her stool studies returned. Her diarrhea got worse with more bleeding on the bentyl and questran. She stopped taking it and her stool studies returned negative so she was given uceris. This did not get her symptoms undercontrol. She was given a prednisone taper. After starting this, her bowels improved but her reflux got worse making her asthma worse and gave her a sinus infection. She was unable to lay in her bed due to difficulty breathing so she slept in her chair. She did not put her feet up and developed a clot in her left leg. She is now on eliquis.  Her main concern today is hair loss. She is seeing derm for this soon. her PCP feels stress is a contributing factor. CS  6/22/2023 Halina is here for f/u of Crohns flare. She had been well controlled on mercaptopurine 50mg once daily and Asacol 2 tablets TID. She was given doxycycline for rosacea last year turning into a terrible flare taking months to get back under control. Today, her bowels are regular. She denies any blood in her stool except once after eating quinoa. She denies any urgency, frequency, or abdominal pain. She remains on Eliquis and hopes to finish up in 3 months. Her labs have remained fairly stable. She is getting an iron infusion soon. CS

## 2024-04-15 ENCOUNTER — LAB (OUTPATIENT)
Dept: URBAN - METROPOLITAN AREA CLINIC 4 | Facility: CLINIC | Age: 79
End: 2024-04-15
Payer: MEDICARE

## 2024-04-15 ENCOUNTER — COLON (OUTPATIENT)
Dept: URBAN - NONMETROPOLITAN AREA SURGERY CENTER 1 | Facility: SURGERY CENTER | Age: 79
End: 2024-04-15
Payer: MEDICARE

## 2024-04-15 DIAGNOSIS — K63.89 OTHER SPECIFIED DISEASES OF INTESTINE: ICD-10-CM

## 2024-04-15 DIAGNOSIS — K52.9 ACUTE COLITIS: ICD-10-CM

## 2024-04-15 DIAGNOSIS — K52.9 NONINFECTIVE GASTROENTERITIS AND COLITIS, UNSPECIFIED: ICD-10-CM

## 2024-04-15 PROCEDURE — 45380 COLONOSCOPY AND BIOPSY: CPT | Performed by: INTERNAL MEDICINE

## 2024-04-15 PROCEDURE — 88305 TISSUE EXAM BY PATHOLOGIST: CPT | Performed by: STUDENT IN AN ORGANIZED HEALTH CARE EDUCATION/TRAINING PROGRAM

## 2024-05-28 ENCOUNTER — TELEPHONE ENCOUNTER (OUTPATIENT)
Dept: URBAN - NONMETROPOLITAN AREA CLINIC 2 | Facility: CLINIC | Age: 79
End: 2024-05-28

## 2024-05-28 RX ORDER — DICYCLOMINE HYDROCHLORIDE 10 MG/1
1 TABLET 30 MINS PRIOR TO A MEAL FOR AND AT BEDTIME CRAMPING/DIARRHEA CAPSULE ORAL
Qty: 120 | Refills: 6
Start: 2022-10-25 | End: 2024-12-24

## 2024-05-30 ENCOUNTER — OFFICE VISIT (OUTPATIENT)
Dept: URBAN - NONMETROPOLITAN AREA CLINIC 13 | Facility: CLINIC | Age: 79
End: 2024-05-30
Payer: MEDICARE

## 2024-05-30 ENCOUNTER — DASHBOARD ENCOUNTERS (OUTPATIENT)
Age: 79
End: 2024-05-30

## 2024-05-30 ENCOUNTER — CLAIMS CREATED FROM THE CLAIM WINDOW (OUTPATIENT)
Dept: URBAN - NONMETROPOLITAN AREA CLINIC 13 | Facility: CLINIC | Age: 79
End: 2024-05-30

## 2024-05-30 VITALS
HEIGHT: 66 IN | HEART RATE: 78 BPM | WEIGHT: 219 LBS | BODY MASS INDEX: 35.2 KG/M2 | DIASTOLIC BLOOD PRESSURE: 77 MMHG | SYSTOLIC BLOOD PRESSURE: 139 MMHG

## 2024-05-30 DIAGNOSIS — K50.90 CROHN'S DISEASE: ICD-10-CM

## 2024-05-30 PROCEDURE — 99214 OFFICE O/P EST MOD 30 MIN: CPT | Performed by: NURSE PRACTITIONER

## 2024-05-30 RX ORDER — OMEPRAZOLE 20 MG/1
1 CAPSULE 30 MINUTES BEFORE MEALS CAPSULE, DELAYED RELEASE ORAL TWICE A DAY
Qty: 180 | Refills: 3 | OUTPATIENT
Start: 2024-05-30

## 2024-05-30 RX ORDER — MESALAMINE 800 MG/1
2 TABLETS TABLET, DELAYED RELEASE ORAL THREE TIMES A DAY
Qty: 540 TABLETS | Refills: 3

## 2024-05-30 RX ORDER — LEVOTHYROXINE SODIUM 0.05 MG/1
TABLET ORAL
Qty: 0 | Refills: 0 | Status: ACTIVE | COMMUNITY
Start: 1900-01-01

## 2024-05-30 RX ORDER — FLUTICASONE PROPIONATE 50 UG/1
SPRAY, METERED NASAL
Qty: 0 | Refills: 0 | Status: ACTIVE | COMMUNITY
Start: 1900-01-01

## 2024-05-30 RX ORDER — CHOLESTYRAMINE 4 G/9G
1 PACKET MIXED WITH WATER OR NON-CARBONATED DRINK POWDER, FOR SUSPENSION ORAL THREE TIMES A DAY
Qty: 90 | Refills: 6 | Status: ACTIVE | COMMUNITY
Start: 2022-10-25

## 2024-05-30 RX ORDER — DICYCLOMINE HYDROCHLORIDE 10 MG/1
1 TABLET 30 MINS PRIOR TO A MEAL FOR AND AT BEDTIME CRAMPING/DIARRHEA CAPSULE ORAL
Qty: 120 | Refills: 6 | Status: ACTIVE | COMMUNITY
Start: 2022-10-25 | End: 2024-12-24

## 2024-05-30 RX ORDER — PREDNISONE 5 MG/1
TAKE 4 PILLS BY MOUTH DAILY FOR 1 WEEK, 3 DAILY FOR A WEEK, 2 DAILY FOR A WEEK, 1 DAILY FOR A WEEK TABLET ORAL
Qty: 100 | Refills: 3 | Status: ON HOLD | COMMUNITY
Start: 2019-09-12

## 2024-05-30 RX ORDER — MERCAPTOPURINE 50 MG/1
1 TABLETS TABLET ORAL ONCE DAILY
Qty: 90 TABLETS | Refills: 3 | Status: ACTIVE | COMMUNITY

## 2024-05-30 RX ORDER — PREDNISONE 10 MG/1
2 TABLET TABLET ORAL ONCE A DAY
Qty: 60 TABLET | Refills: 3
Start: 2019-09-12 | End: 2024-09-27

## 2024-05-30 RX ORDER — MESALAMINE 1.2 G/1
2 TABLETS WITH A MEAL TABLET, DELAYED RELEASE ORAL ONCE A DAY
Qty: 180 TABLET | Refills: 3 | Status: ACTIVE | COMMUNITY
Start: 2023-07-11 | End: 2024-07-05

## 2024-05-30 RX ORDER — ESTRADIOL 0.1 MG/G
CREAM VAGINAL
Qty: 0 | Refills: 0 | Status: ACTIVE | COMMUNITY
Start: 1900-01-01

## 2024-05-30 RX ORDER — MESALAMINE 800 MG/1
2 TABLETS TABLET, DELAYED RELEASE ORAL THREE TIMES A DAY
Qty: 540 TABLETS | Refills: 3 | Status: ACTIVE | COMMUNITY

## 2024-05-30 RX ORDER — MERCAPTOPURINE 50 MG/1
1 TABLETS TABLET ORAL ONCE DAILY
Qty: 90 TABLETS | Refills: 3

## 2024-06-05 ENCOUNTER — TELEPHONE ENCOUNTER (OUTPATIENT)
Dept: URBAN - NONMETROPOLITAN AREA CLINIC 2 | Facility: CLINIC | Age: 79
End: 2024-06-05

## 2024-06-11 ENCOUNTER — TELEPHONE ENCOUNTER (OUTPATIENT)
Dept: URBAN - NONMETROPOLITAN AREA CLINIC 2 | Facility: CLINIC | Age: 79
End: 2024-06-11

## 2024-06-19 ENCOUNTER — TELEPHONE ENCOUNTER (OUTPATIENT)
Dept: URBAN - NONMETROPOLITAN AREA CLINIC 2 | Facility: CLINIC | Age: 79
End: 2024-06-19

## 2024-06-25 ENCOUNTER — TELEPHONE ENCOUNTER (OUTPATIENT)
Dept: URBAN - NONMETROPOLITAN AREA CLINIC 2 | Facility: CLINIC | Age: 79
End: 2024-06-25

## 2024-06-25 RX ORDER — PREDNISONE 10 MG/1
3 TABLET TABLET ORAL ONCE A DAY
Qty: 90 TABLET | Refills: 3
Start: 2019-09-12 | End: 2024-10-23

## 2024-07-12 ENCOUNTER — LAB OUTSIDE AN ENCOUNTER (OUTPATIENT)
Dept: URBAN - NONMETROPOLITAN AREA CLINIC 2 | Facility: CLINIC | Age: 79
End: 2024-07-12

## 2024-07-12 ENCOUNTER — OFFICE VISIT (OUTPATIENT)
Dept: URBAN - NONMETROPOLITAN AREA CLINIC 13 | Facility: CLINIC | Age: 79
End: 2024-07-12
Payer: MEDICARE

## 2024-07-12 VITALS
HEIGHT: 66 IN | DIASTOLIC BLOOD PRESSURE: 62 MMHG | HEART RATE: 80 BPM | WEIGHT: 212.4 LBS | BODY MASS INDEX: 34.13 KG/M2 | SYSTOLIC BLOOD PRESSURE: 104 MMHG

## 2024-07-12 DIAGNOSIS — K50.80 CROHN'S COLITIS: ICD-10-CM

## 2024-07-12 DIAGNOSIS — Z86.19 HISTORY OF CLOSTRIDIUM DIFFICILE COLITIS: ICD-10-CM

## 2024-07-12 DIAGNOSIS — R10.84 ABDOMINAL CRAMPING, GENERALIZED: ICD-10-CM

## 2024-07-12 DIAGNOSIS — Z86.010 PERSONAL HISTORY OF COLONIC POLYPS: ICD-10-CM

## 2024-07-12 LAB
BASOPHILS AUTOMATED ABSOLUTE COUNT: 0
BASOPHILS RELATIVE PERCENT: 0.4
EOSINOPHILS AUTOMATED ABSOLUTE COUNT: 0.1
EOSINOPHILS RELATIVE PERCENT: 1.7
HEMATOCRIT: 38.7
HEMOGLOBIN: 12.6
LYMPHOCYTES AUTOMATED ABSOLUTE COUNT: 0.4
LYMPHOCYTES RELATIVE PERCENT: 6.4
MEAN CORPUSCULAR HEMOGLOBIN CONC: 32.7
MEAN CORPUSCULAR HEMOGLOBIN: 32.3
MEAN CORPUSCULAR VOLUME: 98.9
MEAN PLATELET VOLUME: 7.5
MONOCYTES AUTOMATED ABSOLUTE COUNT: 0.8
MONOCYTES RELATIVE PERCENT: 12.3
NEUTROPHILS AUTOMATED ABSOLUTE: 5.3
NEUTROPHILS RELATIVE PERCENT: 79.2
PLATELET COUNT: 207
RED BLOOD CELL COUNT: 3.92
RED CELL DISTRIBUTION WIDTH: 16.6
WHITE BLOOD CELL COUNT: 6.6

## 2024-07-12 PROCEDURE — 99213 OFFICE O/P EST LOW 20 MIN: CPT | Performed by: NURSE PRACTITIONER

## 2024-07-12 RX ORDER — MERCAPTOPURINE 50 MG/1
2 TABLETS TABLET ORAL ONCE DAILY
Qty: 180 | Refills: 3

## 2024-07-12 RX ORDER — MESALAMINE 800 MG/1
2 TABLETS TABLET, DELAYED RELEASE ORAL THREE TIMES A DAY
Qty: 540 TABLETS | Refills: 3

## 2024-07-12 RX ORDER — DICYCLOMINE HYDROCHLORIDE 10 MG/1
1 TABLET 30 MINS PRIOR TO A MEAL FOR AND AT BEDTIME CRAMPING/DIARRHEA CAPSULE ORAL
Qty: 120 | Refills: 6 | Status: ACTIVE | COMMUNITY
Start: 2022-10-25 | End: 2024-12-24

## 2024-07-12 RX ORDER — PREDNISONE 10 MG/1
2 TABLET TABLET ORAL ONCE A DAY
Qty: 60 TABLET | Refills: 3

## 2024-07-12 RX ORDER — FLUTICASONE PROPIONATE 50 UG/1
SPRAY, METERED NASAL
Qty: 0 | Refills: 0 | Status: ACTIVE | COMMUNITY
Start: 1900-01-01

## 2024-07-12 RX ORDER — MESALAMINE 800 MG/1
2 TABLETS TABLET, DELAYED RELEASE ORAL THREE TIMES A DAY
Qty: 540 TABLETS | Refills: 3 | Status: ACTIVE | COMMUNITY

## 2024-07-12 RX ORDER — CHOLESTYRAMINE 4 G/9G
1 PACKET MIXED WITH WATER OR NON-CARBONATED DRINK POWDER, FOR SUSPENSION ORAL THREE TIMES A DAY
Qty: 90 | Refills: 6 | Status: ACTIVE | COMMUNITY
Start: 2022-10-25

## 2024-07-12 RX ORDER — OMEPRAZOLE 20 MG/1
1 CAPSULE 30 MINUTES BEFORE MEALS CAPSULE, DELAYED RELEASE ORAL TWICE A DAY
Qty: 180 | Refills: 3 | OUTPATIENT

## 2024-07-12 RX ORDER — MERCAPTOPURINE 50 MG/1
1 TABLETS TABLET ORAL ONCE DAILY
Qty: 90 TABLETS | Refills: 3 | Status: ACTIVE | COMMUNITY

## 2024-07-12 RX ORDER — OMEPRAZOLE 20 MG/1
1 CAPSULE 30 MINUTES BEFORE MEALS CAPSULE, DELAYED RELEASE ORAL TWICE A DAY
Qty: 180 | Refills: 3 | Status: ACTIVE | COMMUNITY
Start: 2024-05-30

## 2024-07-12 RX ORDER — LEVOTHYROXINE SODIUM 0.05 MG/1
TABLET ORAL
Qty: 0 | Refills: 0 | Status: ACTIVE | COMMUNITY
Start: 1900-01-01

## 2024-07-12 RX ORDER — ESTRADIOL 0.1 MG/G
CREAM VAGINAL
Qty: 0 | Refills: 0 | Status: ACTIVE | COMMUNITY
Start: 1900-01-01

## 2024-07-12 RX ORDER — PREDNISONE 10 MG/1
3 TABLET TABLET ORAL ONCE A DAY
Qty: 90 TABLET | Refills: 3 | Status: ACTIVE | COMMUNITY
Start: 2019-09-12 | End: 2024-10-23

## 2024-07-12 NOTE — HPI-TODAY'S VISIT:
7/12/2024 Halina returns for follow-up of Crohns disease. She is on asacol and 6MP. Her colonoscopy showed mild chronic active colitis in the sigmoid colon in April. After the colonoscopy, she started having diarrhea, mucous and blood in her stool. She has been getting a different 6MP and wonders if that was the cause. She can not do suppositories. She was not ready for biologics and she did not want to start on high dose steroids of do them for an extended period of time. We started at 20mg and had to go up and extend the time as her symptoms did not improve. Today, she has another week of 5mg. She is having formed stools 4-6 a day. She is no longer having blood or mucous. The pain is better but still a little tender. She would like to increase her 6MP. She is not ready to change to biologic therapy. CS.

## 2024-07-12 NOTE — HPI-OTHER HISTORIES
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  She is currently on mercaptopurine 50 mg daily Asacol.  She had been on a 100 but lab work revealed mild leukopenia and lymphocytopenia as well as a rising bilirubin so that mercaptopurine was stopped for a while and then started back in 50 mg.  Labs have normalized.  She is followed at the hematology clinic for this. In terms of the Crohn's disease she is, overall, doing well.  She does state that since January 1 she has had 9 episodes of rectal bleeding.  This is enough to discolor the water in the commode.  Typically this bleeding is with passage of a hard stool.  She states that she has 1-3 formed stools daily.  At times they are hard and she has to strain.  She has no rectal pain, burning or itching.  She is not aware of hemorrhoids.  She has no abdominal pain.  There is some discomfort if she has a great deal of gas.  She has no fever, chills or night sweats.  She denies extraintestinal manifestations. She has no upper GI symptoms.   2/22/2022 Halina is a very pleasant 77 YO F with Crohns maintained on mercaptopurine 50mg once daily and Asacol who presents for follow up. She had labs in December with [facility] and brings them in for review today. WBC slightly down at 4.2 with lymphocytes of 900. Hgb WNL and LFTS including bili WNL.  She has normal stools with occasional constipation. She did not want to take the fiber but is doing well with prunes in AM and occasionally in the evening as well. Denies abdominal pain, rectal bleeding, black stools, nausea, vomiting.  She follows at [facility] for her MARIA INES and gets injectafer PRN. Also has B12 deficiency on B12 injections monthly, prescribed by [facility].  Her last colonoscopy was 3/2021 with a small TA in the ascending colon, lipoma in ascending colon, left sided diverticulosis. TI was normal. Random colon bx were unremarkable. Due for repeat 3/2024. TG  8/22/2022 Halina is here for f/u of Crohns maintained on mercaptopurine 50mg once daily and Asacol 2 tablets TID. She was last seen in February by Sary Prabhakar NP. Her last colonoscopy was 2021 with normal TI and random bx negative. She denies any constipation or diarrhea. She denies any blood in her stool.  She has been followed by [facility] and her WBC and LFTs have been normal. Her ferritin is down slightly but iron normal. Overall, she feels well today. CS  10/25/2022 Halina is here for possible Crohns flare. She was last seen in August and doing well on mercaptopurine 50mg once daily and Asacol 2 tablets TID. She was given doxycycline for rosacea. She started having diarrhea and had to stop taking it. Her diarrhea has continued 5-6 times a day with leftsided cramping. She has had some mucous and blood in her stool. She has a hx of Cdiff. Her last colonoscopy was 2021 with normal TI and random bx negative. Her prior colonoscopy showed leftsided disease. CS  2/23/2023 Halina is here for f/u of Crohns flare. She had been well controlled on mercaptopurine 50mg once daily and Asacol 2 tablets TID. She was given doxycycline for rosacea. She started having diarrhea and had to stop taking it. Her diarrhea has continued 5-6 times a day with leftsided cramping with blood and mucous. She was given bentyl and questran until her stool studies returned. Her diarrhea got worse with more bleeding on the bentyl and questran. She stopped taking it and her stool studies returned negative so she was given uceris. This did not get her symptoms undercontrol. She was given a prednisone taper. After starting this, her bowels improved but her reflux got worse making her asthma worse and gave her a sinus infection. She was unable to lay in her bed due to difficulty breathing so she slept in her chair. She did not put her feet up and developed a clot in her left leg. She is now on eliquis.  Her main concern today is hair loss. She is seeing derm for this soon. her PCP feels stress is a contributing factor. CS  6/22/2023 Halina is here for f/u of Crohns flare. She had been well controlled on mercaptopurine 50mg once daily and Asacol 2 tablets TID. She was given doxycycline for rosacea last year turning into a terrible flare taking months to get back under control. Today, her bowels are regular. She denies any blood in her stool except once after eating quinoa. She denies any urgency, frequency, or abdominal pain. She remains on Eliquis and hopes to finish up in 3 months. Her labs have remained fairly stable. She is getting an iron infusion soon. CS  12/19/2023 Halina is here for f/u of Crohns flare. She had been well controlled on mercaptopurine 50mg once daily and Asacol 2 tablets TID. She was given doxycycline for rosacea last year turning into a terrible flare taking months to get back under control. Her insurance then changed her asacol to lialda and it took another 6 weeks to get back on track. Today, her bowels are regular. She denies any blood, urgency, frequency, or abdominal pain. She has come off Eliquis. Her labs have remained fairly stable. CS  3/21/2024 Halina returns for follow-up. She had been seeing Dr. Donis booth goals to be prior to seeing Dianelys late 2023. She has Crohn's disease on mercaptopurine and a sick call. She had been doing well but had been given antibiotics for rosacea and since that time has had more issues with her bowels. She currently she complains of bloating. She has little diarrhea or rectal bleeding at this time. She is scheduled for colonoscopy in approximately 3 weeks, April 15. She states that she has been on mercaptopurine for 10 years and wonders about the safety of this. She also notes that she has been hesitant to initiating Biologics in the past and that is why she ultimately decided to remain on 6-mercaptopurine.  4/15/2024 Colonoscopy: hemorrhoids, scattered mild erythematous mucosa in the sigmoid colon, diverticula in the sigmoid. Path chronic acute colitis  5/30/2024 Halina returns for follow-up of Crohns disease. She has been on asacol and 6MP. Her colonoscopy showed mild chronic active colitis in the sigmoid colon. Since the colonoscopy, she has been having diarrhea, mucous and now blood in her stool. She has been getting a different 6MP and wonders if this is the cause. She can not do suppositories. She is not ready for biologics. CS.

## 2024-09-06 ENCOUNTER — OFFICE VISIT (OUTPATIENT)
Dept: URBAN - NONMETROPOLITAN AREA CLINIC 13 | Facility: CLINIC | Age: 79
End: 2024-09-06
Payer: MEDICARE

## 2024-09-06 ENCOUNTER — TELEPHONE ENCOUNTER (OUTPATIENT)
Dept: URBAN - NONMETROPOLITAN AREA CLINIC 2 | Facility: CLINIC | Age: 79
End: 2024-09-06

## 2024-09-06 VITALS
SYSTOLIC BLOOD PRESSURE: 101 MMHG | HEIGHT: 66 IN | HEART RATE: 67 BPM | WEIGHT: 215.4 LBS | BODY MASS INDEX: 34.62 KG/M2 | DIASTOLIC BLOOD PRESSURE: 63 MMHG

## 2024-09-06 DIAGNOSIS — K50.80 CROHN'S COLITIS: ICD-10-CM

## 2024-09-06 DIAGNOSIS — Z86.010 PERSONAL HISTORY OF COLONIC POLYPS: ICD-10-CM

## 2024-09-06 DIAGNOSIS — R10.84 ABDOMINAL CRAMPING, GENERALIZED: ICD-10-CM

## 2024-09-06 DIAGNOSIS — Z12.11 SCREENING FOR COLON CANCER: ICD-10-CM

## 2024-09-06 PROCEDURE — 99213 OFFICE O/P EST LOW 20 MIN: CPT | Performed by: NURSE PRACTITIONER

## 2024-09-06 RX ORDER — OMEPRAZOLE 20 MG/1
1 CAPSULE 30 MINUTES BEFORE MEALS CAPSULE, DELAYED RELEASE ORAL TWICE A DAY
Qty: 180 | Refills: 3 | Status: ACTIVE | COMMUNITY

## 2024-09-06 RX ORDER — PREDNISONE 10 MG/1
2 TABLET TABLET ORAL ONCE A DAY
Qty: 60 TABLET | Refills: 3 | Status: ACTIVE | COMMUNITY

## 2024-09-06 RX ORDER — MESALAMINE 800 MG/1
2 TABLETS TABLET, DELAYED RELEASE ORAL THREE TIMES A DAY
Qty: 540 TABLETS | Refills: 3 | Status: ACTIVE | COMMUNITY

## 2024-09-06 RX ORDER — CHOLESTYRAMINE 4 G/9G
1 PACKET MIXED WITH WATER OR NON-CARBONATED DRINK POWDER, FOR SUSPENSION ORAL THREE TIMES A DAY
Qty: 90 | Refills: 6 | Status: ACTIVE | COMMUNITY
Start: 2022-10-25

## 2024-09-06 RX ORDER — FLUTICASONE PROPIONATE 50 UG/1
SPRAY, METERED NASAL
Qty: 0 | Refills: 0 | Status: ACTIVE | COMMUNITY
Start: 1900-01-01

## 2024-09-06 RX ORDER — PREDNISONE 10 MG/1
2 TABLET TABLET ORAL ONCE A DAY
Qty: 60 TABLET | Refills: 3

## 2024-09-06 RX ORDER — LEVOTHYROXINE SODIUM 0.05 MG/1
TABLET ORAL
Qty: 0 | Refills: 0 | Status: ACTIVE | COMMUNITY
Start: 1900-01-01

## 2024-09-06 RX ORDER — MERCAPTOPURINE 50 MG/1
2 TABLETS TABLET ORAL ONCE DAILY
Qty: 180 | Refills: 3 | Status: ACTIVE | COMMUNITY

## 2024-09-06 RX ORDER — DICYCLOMINE HYDROCHLORIDE 10 MG/1
1 TABLET 30 MINS PRIOR TO A MEAL FOR AND AT BEDTIME CRAMPING/DIARRHEA CAPSULE ORAL
Qty: 120 | Refills: 6 | Status: ACTIVE | COMMUNITY
Start: 2022-10-25 | End: 2024-12-24

## 2024-09-06 RX ORDER — OMEPRAZOLE 20 MG/1
1 CAPSULE 30 MINUTES BEFORE MEALS CAPSULE, DELAYED RELEASE ORAL TWICE A DAY
Qty: 180 | Refills: 3 | OUTPATIENT

## 2024-09-06 RX ORDER — MERCAPTOPURINE 50 MG/1
2 TABLETS TABLET ORAL ONCE DAILY
Qty: 180 | Refills: 3

## 2024-09-06 RX ORDER — ESTRADIOL 0.1 MG/G
CREAM VAGINAL
Qty: 0 | Refills: 0 | Status: ACTIVE | COMMUNITY
Start: 1900-01-01

## 2024-09-06 RX ORDER — MESALAMINE 800 MG/1
2 TABLETS TABLET, DELAYED RELEASE ORAL THREE TIMES A DAY
Qty: 540 TABLETS | Refills: 3

## 2024-09-06 NOTE — HPI-OTHER HISTORIES
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  She is currently on mercaptopurine 50 mg daily Asacol.  She had been on a 100 but lab work revealed mild leukopenia and lymphocytopenia as well as a rising bilirubin so that mercaptopurine was stopped for a while and then started back in 50 mg.  Labs have normalized.  She is followed at the hematology clinic for this. In terms of the Crohn's disease she is, overall, doing well.  She does state that since January 1 she has had 9 episodes of rectal bleeding.  This is enough to discolor the water in the commode.  Typically this bleeding is with passage of a hard stool.  She states that she has 1-3 formed stools daily.  At times they are hard and she has to strain.  She has no rectal pain, burning or itching.  She is not aware of hemorrhoids.  She has no abdominal pain.  There is some discomfort if she has a great deal of gas.  She has no fever, chills or night sweats.  She denies extraintestinal manifestations. She has no upper GI symptoms.   2/22/2022 Halina is a very pleasant 77 YO F with Crohns maintained on mercaptopurine 50mg once daily and Asacol who presents for follow up. She had labs in December with [facility] and brings them in for review today. WBC slightly down at 4.2 with lymphocytes of 900. Hgb WNL and LFTS including bili WNL.  She has normal stools with occasional constipation. She did not want to take the fiber but is doing well with prunes in AM and occasionally in the evening as well. Denies abdominal pain, rectal bleeding, black stools, nausea, vomiting.  She follows at [facility] for her MARIA INES and gets injectafer PRN. Also has B12 deficiency on B12 injections monthly, prescribed by [facility].  Her last colonoscopy was 3/2021 with a small TA in the ascending colon, lipoma in ascending colon, left sided diverticulosis. TI was normal. Random colon bx were unremarkable. Due for repeat 3/2024. TG  8/22/2022 Halina is here for f/u of Crohns maintained on mercaptopurine 50mg once daily and Asacol 2 tablets TID. She was last seen in February by Sary Prabhakar NP. Her last colonoscopy was 2021 with normal TI and random bx negative. She denies any constipation or diarrhea. She denies any blood in her stool.  She has been followed by [facility] and her WBC and LFTs have been normal. Her ferritin is down slightly but iron normal. Overall, she feels well today. CS  10/25/2022 Halina is here for possible Crohns flare. She was last seen in August and doing well on mercaptopurine 50mg once daily and Asacol 2 tablets TID. She was given doxycycline for rosacea. She started having diarrhea and had to stop taking it. Her diarrhea has continued 5-6 times a day with leftsided cramping. She has had some mucous and blood in her stool. She has a hx of Cdiff. Her last colonoscopy was 2021 with normal TI and random bx negative. Her prior colonoscopy showed leftsided disease. CS  2/23/2023 Halina is here for f/u of Crohns flare. She had been well controlled on mercaptopurine 50mg once daily and Asacol 2 tablets TID. She was given doxycycline for rosacea. She started having diarrhea and had to stop taking it. Her diarrhea has continued 5-6 times a day with leftsided cramping with blood and mucous. She was given bentyl and questran until her stool studies returned. Her diarrhea got worse with more bleeding on the bentyl and questran. She stopped taking it and her stool studies returned negative so she was given uceris. This did not get her symptoms undercontrol. She was given a prednisone taper. After starting this, her bowels improved but her reflux got worse making her asthma worse and gave her a sinus infection. She was unable to lay in her bed due to difficulty breathing so she slept in her chair. She did not put her feet up and developed a clot in her left leg. She is now on eliquis.  Her main concern today is hair loss. She is seeing derm for this soon. her PCP feels stress is a contributing factor. CS  6/22/2023 Halina is here for f/u of Crohns flare. She had been well controlled on mercaptopurine 50mg once daily and Asacol 2 tablets TID. She was given doxycycline for rosacea last year turning into a terrible flare taking months to get back under control. Today, her bowels are regular. She denies any blood in her stool except once after eating quinoa. She denies any urgency, frequency, or abdominal pain. She remains on Eliquis and hopes to finish up in 3 months. Her labs have remained fairly stable. She is getting an iron infusion soon. CS  12/19/2023 Halina is here for f/u of Crohns flare. She had been well controlled on mercaptopurine 50mg once daily and Asacol 2 tablets TID. She was given doxycycline for rosacea last year turning into a terrible flare taking months to get back under control. Her insurance then changed her asacol to lialda and it took another 6 weeks to get back on track. Today, her bowels are regular. She denies any blood, urgency, frequency, or abdominal pain. She has come off Eliquis. Her labs have remained fairly stable. CS  3/21/2024 Halina returns for follow-up. She had been seeing Dr. Donis booth goals to be prior to seeing Dianelys late 2023. She has Crohn's disease on mercaptopurine and a sick call. She had been doing well but had been given antibiotics for rosacea and since that time has had more issues with her bowels. She currently she complains of bloating. She has little diarrhea or rectal bleeding at this time. She is scheduled for colonoscopy in approximately 3 weeks, April 15. She states that she has been on mercaptopurine for 10 years and wonders about the safety of this. She also notes that she has been hesitant to initiating Biologics in the past and that is why she ultimately decided to remain on 6-mercaptopurine.  4/15/2024 Colonoscopy: hemorrhoids, scattered mild erythematous mucosa in the sigmoid colon, diverticula in the sigmoid. Path chronic acute colitis  5/30/2024 Halina returns for follow-up of Crohns disease. She has been on asacol and 6MP. Her colonoscopy showed mild chronic active colitis in the sigmoid colon. Since the colonoscopy, she has been having diarrhea, mucous and now blood in her stool. She has been getting a different 6MP and wonders if this is the cause. She can not do suppositories. She is not ready for biologics. CS.  7/12/2024 Halina returns for follow-up of Crohns disease. She is on asacol and 6MP. Her colonoscopy showed mild chronic active colitis in the sigmoid colon in April. After the colonoscopy, she started having diarrhea, mucous and blood in her stool. She has been getting a different 6MP and wonders if that was the cause. She can not do suppositories. She was not ready for biologics and she did not want to start on high dose steroids of do them for an extended period of time. We started at 20mg and had to go up and extend the time as her symptoms did not improve. Today, she has another week of 5mg. She is having formed stools 4-6 a day. She is no longer having blood or mucous. The pain is better but still a little tender. She would like to increase her 6MP. She is not ready to change to biologic therapy. CS.

## 2024-09-06 NOTE — HPI-TODAY'S VISIT:
9/6/2024 Halina returns for follow-up of Crohns disease. She is on asacol and 6MP. Her colonoscopy showed mild chronic active colitis in the sigmoid colon in April. After the colonoscopy, she started having diarrhea, mucous and blood in her stool. She did a lower/extended prednisone taper. At her last OV, she was doing better but stil having some frequency and abdominal tenderness. She wanted to increase her 6MP first as she was not ready to change to biologic therapy. This did not help. She is has talked with Dr Euceda and now agrees to start Cimzi. She had labs with Dr Euceda- she is not sure if she had labs for TB and Hep B. CS.

## 2024-09-12 ENCOUNTER — TELEPHONE ENCOUNTER (OUTPATIENT)
Dept: URBAN - NONMETROPOLITAN AREA CLINIC 2 | Facility: CLINIC | Age: 79
End: 2024-09-12

## 2024-10-24 ENCOUNTER — TELEPHONE ENCOUNTER (OUTPATIENT)
Dept: URBAN - NONMETROPOLITAN AREA CLINIC 2 | Facility: CLINIC | Age: 79
End: 2024-10-24

## 2024-12-02 ENCOUNTER — OFFICE VISIT (OUTPATIENT)
Dept: URBAN - NONMETROPOLITAN AREA CLINIC 13 | Facility: CLINIC | Age: 79
End: 2024-12-02
Payer: MEDICARE

## 2024-12-02 VITALS
WEIGHT: 207 LBS | BODY MASS INDEX: 33.27 KG/M2 | HEIGHT: 66 IN | DIASTOLIC BLOOD PRESSURE: 72 MMHG | SYSTOLIC BLOOD PRESSURE: 128 MMHG | HEART RATE: 78 BPM

## 2024-12-02 DIAGNOSIS — K50.90 CROHN'S DISEASE: ICD-10-CM

## 2024-12-02 DIAGNOSIS — R10.9 ABDOMINAL PAIN: ICD-10-CM

## 2024-12-02 DIAGNOSIS — Z51.81 THERAPEUTIC DRUG MONITORING: ICD-10-CM

## 2024-12-02 DIAGNOSIS — Z12.11 SCREENING FOR COLON CANCER: ICD-10-CM

## 2024-12-02 DIAGNOSIS — Z86.19 HISTORY OF CLOSTRIDIUM DIFFICILE COLITIS: ICD-10-CM

## 2024-12-02 PROCEDURE — 99213 OFFICE O/P EST LOW 20 MIN: CPT | Performed by: NURSE PRACTITIONER

## 2024-12-02 RX ORDER — MESALAMINE 800 MG/1
2 TABLETS TABLET, DELAYED RELEASE ORAL THREE TIMES A DAY
Qty: 540 TABLETS | Refills: 3

## 2024-12-02 RX ORDER — LEVOTHYROXINE SODIUM 0.05 MG/1
TABLET ORAL
Qty: 0 | Refills: 0 | Status: ACTIVE | COMMUNITY
Start: 1900-01-01

## 2024-12-02 RX ORDER — ESTRADIOL 0.1 MG/G
CREAM VAGINAL
Qty: 0 | Refills: 0 | Status: ACTIVE | COMMUNITY
Start: 1900-01-01

## 2024-12-02 RX ORDER — MERCAPTOPURINE 50 MG/1
2 TABLETS TABLET ORAL ONCE DAILY
Qty: 180 | Refills: 3 | Status: ACTIVE | COMMUNITY

## 2024-12-02 RX ORDER — MESALAMINE 800 MG/1
2 TABLETS TABLET, DELAYED RELEASE ORAL THREE TIMES A DAY
Qty: 540 TABLETS | Refills: 3 | Status: ACTIVE | COMMUNITY

## 2024-12-02 RX ORDER — OMEPRAZOLE 20 MG/1
1 CAPSULE 30 MINUTES BEFORE MEALS CAPSULE, DELAYED RELEASE ORAL TWICE A DAY
Qty: 180 | Refills: 3 | Status: ACTIVE | COMMUNITY

## 2024-12-02 RX ORDER — OMEPRAZOLE 20 MG/1
1 CAPSULE 30 MINUTES BEFORE MEALS CAPSULE, DELAYED RELEASE ORAL TWICE A DAY
Qty: 180 | Refills: 3 | OUTPATIENT

## 2024-12-02 RX ORDER — PREDNISONE 10 MG/1
2 TABLET TABLET ORAL ONCE A DAY
Qty: 60 TABLET | Refills: 3

## 2024-12-02 RX ORDER — MERCAPTOPURINE 50 MG/1
2 TABLETS TABLET ORAL ONCE DAILY
Qty: 180 | Refills: 3

## 2024-12-02 RX ORDER — CHOLESTYRAMINE 4 G/9G
1 PACKET MIXED WITH WATER OR NON-CARBONATED DRINK POWDER, FOR SUSPENSION ORAL THREE TIMES A DAY
Qty: 90 | Refills: 6 | Status: ACTIVE | COMMUNITY
Start: 2022-10-25

## 2024-12-02 RX ORDER — FLUTICASONE PROPIONATE 50 UG/1
SPRAY, METERED NASAL
Qty: 0 | Refills: 0 | Status: ACTIVE | COMMUNITY
Start: 1900-01-01

## 2024-12-02 RX ORDER — PREDNISONE 10 MG/1
2 TABLET TABLET ORAL ONCE A DAY
Qty: 60 TABLET | Refills: 3 | Status: ACTIVE | COMMUNITY

## 2024-12-02 RX ORDER — DICYCLOMINE HYDROCHLORIDE 10 MG/1
1 TABLET 30 MINS PRIOR TO A MEAL FOR AND AT BEDTIME CRAMPING/DIARRHEA CAPSULE ORAL
Qty: 120 | Refills: 6 | Status: ACTIVE | COMMUNITY
Start: 2022-10-25 | End: 2024-12-24

## 2024-12-02 NOTE — HPI-TODAY'S VISIT:
12/2/2024 Halina returns for follow-up of Crohns disease. She is on asacol and 6MP. Her colonoscopy showed mild chronic active colitis in the sigmoid colon in April. Since her colonoscopy, she started having diarrhea, mucous and blood in her stool. She has done multiple prednisone tapers and continued to have symptoms. She discussed her symptoms wtih Dr Euceda and she has been started on Cimzi. She started this about 6 weeks ago. She is off 6MP and prednsione. She continues to have joint aches and diarrhea. She had labs with Dr Malcolm 11/12 that were normal. CS

## 2024-12-02 NOTE — HPI-OTHER HISTORIES
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  She is currently on mercaptopurine 50 mg daily Asacol.  She had been on a 100 but lab work revealed mild leukopenia and lymphocytopenia as well as a rising bilirubin so that mercaptopurine was stopped for a while and then started back in 50 mg.  Labs have normalized.  She is followed at the hematology clinic for this. In terms of the Crohn's disease she is, overall, doing well.  She does state that since January 1 she has had 9 episodes of rectal bleeding.  This is enough to discolor the water in the commode.  Typically this bleeding is with passage of a hard stool.  She states that she has 1-3 formed stools daily.  At times they are hard and she has to strain.  She has no rectal pain, burning or itching.  She is not aware of hemorrhoids.  She has no abdominal pain.  There is some discomfort if she has a great deal of gas.  She has no fever, chills or night sweats.  She denies extraintestinal manifestations. She has no upper GI symptoms.   2/22/2022 Halina is a very pleasant 75 YO F with Crohns maintained on mercaptopurine 50mg once daily and Asacol who presents for follow up. She had labs in December with [facility] and brings them in for review today. WBC slightly down at 4.2 with lymphocytes of 900. Hgb WNL and LFTS including bili WNL.  She has normal stools with occasional constipation. She did not want to take the fiber but is doing well with prunes in AM and occasionally in the evening as well. Denies abdominal pain, rectal bleeding, black stools, nausea, vomiting.  She follows at [facility] for her MARIA INES and gets injectafer PRN. Also has B12 deficiency on B12 injections monthly, prescribed by [facility].  Her last colonoscopy was 3/2021 with a small TA in the ascending colon, lipoma in ascending colon, left sided diverticulosis. TI was normal. Random colon bx were unremarkable. Due for repeat 3/2024. TG  8/22/2022 Halina is here for f/u of Crohns maintained on mercaptopurine 50mg once daily and Asacol 2 tablets TID. She was last seen in February by Sary Prabhakar NP. Her last colonoscopy was 2021 with normal TI and random bx negative. She denies any constipation or diarrhea. She denies any blood in her stool.  She has been followed by [facility] and her WBC and LFTs have been normal. Her ferritin is down slightly but iron normal. Overall, she feels well today. CS  10/25/2022 Halina is here for possible Crohns flare. She was last seen in August and doing well on mercaptopurine 50mg once daily and Asacol 2 tablets TID. She was given doxycycline for rosacea. She started having diarrhea and had to stop taking it. Her diarrhea has continued 5-6 times a day with leftsided cramping. She has had some mucous and blood in her stool. She has a hx of Cdiff. Her last colonoscopy was 2021 with normal TI and random bx negative. Her prior colonoscopy showed leftsided disease. CS  2/23/2023 Halina is here for f/u of Crohns flare. She had been well controlled on mercaptopurine 50mg once daily and Asacol 2 tablets TID. She was given doxycycline for rosacea. She started having diarrhea and had to stop taking it. Her diarrhea has continued 5-6 times a day with leftsided cramping with blood and mucous. She was given bentyl and questran until her stool studies returned. Her diarrhea got worse with more bleeding on the bentyl and questran. She stopped taking it and her stool studies returned negative so she was given uceris. This did not get her symptoms undercontrol. She was given a prednisone taper. After starting this, her bowels improved but her reflux got worse making her asthma worse and gave her a sinus infection. She was unable to lay in her bed due to difficulty breathing so she slept in her chair. She did not put her feet up and developed a clot in her left leg. She is now on eliquis.  Her main concern today is hair loss. She is seeing derm for this soon. her PCP feels stress is a contributing factor. CS  6/22/2023 Halina is here for f/u of Crohns flare. She had been well controlled on mercaptopurine 50mg once daily and Asacol 2 tablets TID. She was given doxycycline for rosacea last year turning into a terrible flare taking months to get back under control. Today, her bowels are regular. She denies any blood in her stool except once after eating quinoa. She denies any urgency, frequency, or abdominal pain. She remains on Eliquis and hopes to finish up in 3 months. Her labs have remained fairly stable. She is getting an iron infusion soon. CS  12/19/2023 Halina is here for f/u of Crohns flare. She had been well controlled on mercaptopurine 50mg once daily and Asacol 2 tablets TID. She was given doxycycline for rosacea last year turning into a terrible flare taking months to get back under control. Her insurance then changed her asacol to lialda and it took another 6 weeks to get back on track. Today, her bowels are regular. She denies any blood, urgency, frequency, or abdominal pain. She has come off Eliquis. Her labs have remained fairly stable. CS  3/21/2024 Halina returns for follow-up. She had been seeing Dr. Donis booth goals to be prior to seeing Dianelys late 2023. She has Crohn's disease on mercaptopurine and a sick call. She had been doing well but had been given antibiotics for rosacea and since that time has had more issues with her bowels. She currently she complains of bloating. She has little diarrhea or rectal bleeding at this time. She is scheduled for colonoscopy in approximately 3 weeks, April 15. She states that she has been on mercaptopurine for 10 years and wonders about the safety of this. She also notes that she has been hesitant to initiating Biologics in the past and that is why she ultimately decided to remain on 6-mercaptopurine.  4/15/2024 Colonoscopy: hemorrhoids, scattered mild erythematous mucosa in the sigmoid colon, diverticula in the sigmoid. Path chronic acute colitis  5/30/2024 Halina returns for follow-up of Crohns disease. She has been on asacol and 6MP. Her colonoscopy showed mild chronic active colitis in the sigmoid colon. Since the colonoscopy, she has been having diarrhea, mucous and now blood in her stool. She has been getting a different 6MP and wonders if this is the cause. She can not do suppositories. She is not ready for biologics. CS.  7/12/2024 Halina returns for follow-up of Crohns disease. She is on asacol and 6MP. Her colonoscopy showed mild chronic active colitis in the sigmoid colon in April. After the colonoscopy, she started having diarrhea, mucous and blood in her stool. She has been getting a different 6MP and wonders if that was the cause. She can not do suppositories. She was not ready for biologics and she did not want to start on high dose steroids of do them for an extended period of time. We started at 20mg and had to go up and extend the time as her symptoms did not improve. Today, she has another week of 5mg. She is having formed stools 4-6 a day. She is no longer having blood or mucous. The pain is better but still a little tender. She would like to increase her 6MP. She is not ready to change to biologic therapy. CS.  9/6/2024 Halina returns for follow-up of Crohns disease. She is on asacol and 6MP. Her colonoscopy showed mild chronic active colitis in the sigmoid colon in April. After the colonoscopy, she started having diarrhea, mucous and blood in her stool. She did a lower/extended prednisone taper. At her last OV, she was doing better but stil having some frequency and abdominal tenderness. She wanted to increase her 6MP first as she was not ready to change to biologic therapy. This did not help. She is has talked with Dr Euceda and now agrees to start Cimzi. She had labs with Dr Euceda- she is not sure if she had labs for TB and Hep B. CS.

## 2025-03-17 ENCOUNTER — OFFICE VISIT (OUTPATIENT)
Dept: URBAN - NONMETROPOLITAN AREA CLINIC 13 | Facility: CLINIC | Age: 80
End: 2025-03-17